# Patient Record
Sex: FEMALE | Race: WHITE | Employment: FULL TIME | URBAN - METROPOLITAN AREA
[De-identification: names, ages, dates, MRNs, and addresses within clinical notes are randomized per-mention and may not be internally consistent; named-entity substitution may affect disease eponyms.]

---

## 2018-05-16 ENCOUNTER — HOSPITAL ENCOUNTER (OUTPATIENT)
Dept: PREADMISSION TESTING | Age: 48
Discharge: HOME | End: 2018-05-16
Payer: COMMERCIAL

## 2018-05-16 ENCOUNTER — HOSPITAL ENCOUNTER (OUTPATIENT)
Dept: OTHER | Age: 48
Discharge: HOME | End: 2018-05-16
Payer: COMMERCIAL

## 2018-05-16 ENCOUNTER — HOSPITAL ENCOUNTER (OUTPATIENT)
Dept: LAB | Age: 48
Discharge: HOME | End: 2018-05-16
Payer: COMMERCIAL

## 2018-05-16 DIAGNOSIS — Z01.811 PRE-OP CHEST EXAM: ICD-10-CM

## 2018-05-16 DIAGNOSIS — Z01.818 PRE-OP TESTING: ICD-10-CM

## 2018-05-16 LAB
ALBUMIN SERPL-MCNC: 3.7 G/DL (ref 3.4–5)
ALBUMIN/GLOB SERPL: 1 {RATIO} (ref 0.8–1.7)
ALP SERPL-CCNC: 59 U/L (ref 45–117)
ALT SERPL-CCNC: 42 U/L (ref 13–56)
ANION GAP SERPL CALC-SCNC: 9 MMOL/L (ref 3–18)
APPEARANCE UR: ABNORMAL
APTT PPP: 26.2 SEC (ref 23–36.4)
AST SERPL-CCNC: 34 U/L (ref 15–37)
ATRIAL RATE: 54 BPM
BACTERIA URNS QL MICRO: ABNORMAL /HPF
BASOPHILS # BLD: 0 K/UL (ref 0–0.06)
BASOPHILS NFR BLD: 1 % (ref 0–2)
BILIRUB SERPL-MCNC: 0.4 MG/DL (ref 0.2–1)
BILIRUB UR QL: NEGATIVE
BUN SERPL-MCNC: 14 MG/DL (ref 7–18)
BUN/CREAT SERPL: 18 (ref 12–20)
CALCIUM SERPL-MCNC: 8.9 MG/DL (ref 8.5–10.1)
CALCULATED P AXIS, ECG09: 32 DEGREES
CALCULATED R AXIS, ECG10: 79 DEGREES
CALCULATED T AXIS, ECG11: 62 DEGREES
CHLORIDE SERPL-SCNC: 103 MMOL/L (ref 100–108)
CO2 SERPL-SCNC: 29 MMOL/L (ref 21–32)
COLOR UR: YELLOW
CREAT SERPL-MCNC: 0.8 MG/DL (ref 0.6–1.3)
DIAGNOSIS, 93000: NORMAL
DIFFERENTIAL METHOD BLD: NORMAL
EOSINOPHIL # BLD: 0.1 K/UL (ref 0–0.4)
EOSINOPHIL NFR BLD: 1 % (ref 0–5)
EPITH CASTS URNS QL MICRO: ABNORMAL /LPF (ref 0–5)
ERYTHROCYTE [DISTWIDTH] IN BLOOD BY AUTOMATED COUNT: 14.4 % (ref 11.6–14.5)
GLOBULIN SER CALC-MCNC: 3.8 G/DL (ref 2–4)
GLUCOSE SERPL-MCNC: 85 MG/DL (ref 74–99)
GLUCOSE UR STRIP.AUTO-MCNC: NEGATIVE MG/DL
HCT VFR BLD AUTO: 44.7 % (ref 35–45)
HGB BLD-MCNC: 15.1 G/DL (ref 12–16)
HGB UR QL STRIP: NEGATIVE
INR PPP: 1 (ref 0.8–1.2)
KETONES UR QL STRIP.AUTO: NEGATIVE MG/DL
LEUKOCYTE ESTERASE UR QL STRIP.AUTO: ABNORMAL
LYMPHOCYTES # BLD: 1.5 K/UL (ref 0.9–3.6)
LYMPHOCYTES NFR BLD: 28 % (ref 21–52)
MCH RBC QN AUTO: 31.2 PG (ref 24–34)
MCHC RBC AUTO-ENTMCNC: 33.8 G/DL (ref 31–37)
MCV RBC AUTO: 92.4 FL (ref 74–97)
MONOCYTES # BLD: 0.4 K/UL (ref 0.05–1.2)
MONOCYTES NFR BLD: 7 % (ref 3–10)
NEUTS SEG # BLD: 3.3 K/UL (ref 1.8–8)
NEUTS SEG NFR BLD: 63 % (ref 40–73)
NITRITE UR QL STRIP.AUTO: NEGATIVE
P-R INTERVAL, ECG05: 122 MS
PH UR STRIP: 8.5 [PH] (ref 5–8)
PLATELET # BLD AUTO: 291 K/UL (ref 135–420)
PMV BLD AUTO: 10.6 FL (ref 9.2–11.8)
POTASSIUM SERPL-SCNC: 4.6 MMOL/L (ref 3.5–5.5)
PROT SERPL-MCNC: 7.5 G/DL (ref 6.4–8.2)
PROT UR STRIP-MCNC: NEGATIVE MG/DL
PROTHROMBIN TIME: 12.5 SEC (ref 11.5–15.2)
Q-T INTERVAL, ECG07: 446 MS
QRS DURATION, ECG06: 90 MS
QTC CALCULATION (BEZET), ECG08: 422 MS
RBC # BLD AUTO: 4.84 M/UL (ref 4.2–5.3)
RBC #/AREA URNS HPF: 0 /HPF (ref 0–5)
SODIUM SERPL-SCNC: 141 MMOL/L (ref 136–145)
SP GR UR REFRACTOMETRY: 1.01 (ref 1–1.03)
UROBILINOGEN UR QL STRIP.AUTO: 0.2 EU/DL (ref 0.2–1)
VENTRICULAR RATE, ECG03: 54 BPM
WBC # BLD AUTO: 5.2 K/UL (ref 4.6–13.2)
WBC URNS QL MICRO: ABNORMAL /HPF (ref 0–4)

## 2018-05-16 PROCEDURE — 87086 URINE CULTURE/COLONY COUNT: CPT | Performed by: NEUROLOGICAL SURGERY

## 2018-05-16 PROCEDURE — 36415 COLL VENOUS BLD VENIPUNCTURE: CPT | Performed by: NEUROLOGICAL SURGERY

## 2018-05-16 PROCEDURE — 85610 PROTHROMBIN TIME: CPT | Performed by: NEUROLOGICAL SURGERY

## 2018-05-16 PROCEDURE — 85025 COMPLETE CBC W/AUTO DIFF WBC: CPT | Performed by: NEUROLOGICAL SURGERY

## 2018-05-16 PROCEDURE — 93005 ELECTROCARDIOGRAM TRACING: CPT

## 2018-05-16 PROCEDURE — 85730 THROMBOPLASTIN TIME PARTIAL: CPT | Performed by: NEUROLOGICAL SURGERY

## 2018-05-16 PROCEDURE — 80053 COMPREHEN METABOLIC PANEL: CPT | Performed by: NEUROLOGICAL SURGERY

## 2018-05-16 PROCEDURE — 81001 URINALYSIS AUTO W/SCOPE: CPT | Performed by: NEUROLOGICAL SURGERY

## 2018-05-16 PROCEDURE — 71046 X-RAY EXAM CHEST 2 VIEWS: CPT

## 2018-05-16 NOTE — PERIOP NOTES
PAT - SURGICAL PRE-ADMISSION INSTRUCTIONS    NAME:  Tate Peralta                                                          TODAY'S DATE:  5/16/2018    SURGERY DATE:  5/31/2018                                  SURGERY ARRIVAL TIME:   0630    1. Do NOT eat or drink anything, including candy or gum, after MIDNIGHT on 05/30/18 , unless you have specific instructions from your Surgeon or Anesthesia Provider to do so. 2. No smoking on the day of surgery. 3. No alcohol 24 hours prior to the day of surgery. 4. No recreational drugs for one week prior to the day of surgery. 5. Leave all valuables, including money/purse, at home. 6. Remove all jewelry, nail polish, makeup (including mascara); no lotions, powders, deodorant, or perfume/cologne/after shave. 7. Glasses/Contact lenses and Dentures may be worn to the hospital.  They will be removed prior to surgery. 8. Call your doctor if symptoms of a cold or illness develop within 24 ours prior to surgery. 9. AN ADULT MUST DRIVE YOU HOME AFTER OUTPATIENT SURGERY. 10. If you are having an OUTPATIENT procedure, please make arrangements for a responsible adult to be with you for 24 hours after your surgery. 11. If you are admitted to the hospital, you will be assigned to a bed after surgery is complete. Normally a family member will not be able to see you until you are in your assigned bed. 15. Family is encouraged to accompany you to the hospital.  We ask visitors in the treatment area to be limited to ONE person at a time to ensure patient privacy. EXCEPTIONS WILL BE MADE AS NEEDED. 15. Children under 12 are discouraged from entering the treatment area and need to be supervised by an adult when in the waiting room. Special Instructions:    NONE. Patient Prep:    use CHG solution    These surgical instructions were reviewed with Mehrdad Martinez during the PAT visit. A printed copy of the instructions was provided to Mehrdad Martinez. Directions:   On the morning of surgery, please go to the [facility]. Enter the building from the [facility] entrance, 1st floor (next to the Emergency Room entrance). Take the elevator to the 2nd floor. Sign in at the Registration Desk.     If you have any questions and/or concerns, please do not hesitate to call:  (Prior to the day of surgery)  [facility] unit:  [phone]  (Day of surgery)  [facility] unit:  [phone]

## 2018-05-18 LAB
BACTERIA SPEC CULT: NORMAL
SERVICE CMNT-IMP: NORMAL

## 2018-05-30 ENCOUNTER — HOSPITAL ENCOUNTER (OUTPATIENT)
Dept: MRI IMAGING | Age: 48
Discharge: HOME | DRG: 027 | End: 2018-05-30
Attending: NEUROLOGICAL SURGERY
Payer: COMMERCIAL

## 2018-05-30 ENCOUNTER — ANESTHESIA EVENT (OUTPATIENT)
Dept: SURGERY | Age: 48
DRG: 027 | End: 2018-05-30
Payer: COMMERCIAL

## 2018-05-30 ENCOUNTER — HOSPITAL ENCOUNTER (OUTPATIENT)
Dept: PREADMISSION TESTING | Age: 48
Discharge: HOME | DRG: 027 | End: 2018-05-30
Attending: NEUROLOGICAL SURGERY
Payer: COMMERCIAL

## 2018-05-30 VITALS — BODY MASS INDEX: 19.94 KG/M2 | WEIGHT: 135 LBS

## 2018-05-30 DIAGNOSIS — D32.9 MENINGIOMA (HCC): ICD-10-CM

## 2018-05-30 DIAGNOSIS — Z01.818 PRE-OP TESTING: ICD-10-CM

## 2018-05-30 LAB
ABO + RH BLD: NORMAL
BLOOD GROUP ANTIBODIES SERPL: NORMAL
SPECIMEN EXP DATE BLD: NORMAL

## 2018-05-30 PROCEDURE — 36415 COLL VENOUS BLD VENIPUNCTURE: CPT | Performed by: NEUROLOGICAL SURGERY

## 2018-05-30 PROCEDURE — A9575 INJ GADOTERATE MEGLUMI 0.1ML: HCPCS | Performed by: NEUROLOGICAL SURGERY

## 2018-05-30 PROCEDURE — 86900 BLOOD TYPING SEROLOGIC ABO: CPT | Performed by: NEUROLOGICAL SURGERY

## 2018-05-30 PROCEDURE — 70553 MRI BRAIN STEM W/O & W/DYE: CPT

## 2018-05-30 PROCEDURE — 74011250636 HC RX REV CODE- 250/636: Performed by: NEUROLOGICAL SURGERY

## 2018-05-30 RX ORDER — GADOTERATE MEGLUMINE 376.9 MG/ML
10 INJECTION INTRAVENOUS
Status: COMPLETED | OUTPATIENT
Start: 2018-05-30 | End: 2018-05-30

## 2018-05-30 RX ADMIN — GADOTERATE MEGLUMINE 10 ML: 376.9 INJECTION INTRAVENOUS at 13:52

## 2018-05-31 ENCOUNTER — HOSPITAL ENCOUNTER (INPATIENT)
Age: 48
LOS: 2 days | Discharge: HOME | DRG: 027 | End: 2018-06-02
Attending: NEUROLOGICAL SURGERY | Admitting: NEUROLOGICAL SURGERY
Payer: COMMERCIAL

## 2018-05-31 ENCOUNTER — ANESTHESIA (OUTPATIENT)
Dept: SURGERY | Age: 48
DRG: 027 | End: 2018-05-31
Payer: COMMERCIAL

## 2018-05-31 DIAGNOSIS — Z98.890 S/P CRANIOTOMY: Primary | ICD-10-CM

## 2018-05-31 PROBLEM — D49.6 BRAIN TUMOR (HCC): Status: ACTIVE | Noted: 2018-05-31

## 2018-05-31 LAB — HCG UR QL: NEGATIVE

## 2018-05-31 PROCEDURE — 76060000038 HC ANESTHESIA 3.5 TO 4 HR: Performed by: NEUROLOGICAL SURGERY

## 2018-05-31 PROCEDURE — 77030008462 HC STPLR SKN PROX J&J -A: Performed by: NEUROLOGICAL SURGERY

## 2018-05-31 PROCEDURE — 76010000174 HC OR TIME 3.5 TO 4 HR INTENSV-TIER 1: Performed by: NEUROLOGICAL SURGERY

## 2018-05-31 PROCEDURE — 77030032490 HC SLV COMPR SCD KNE COVD -B: Performed by: NEUROLOGICAL SURGERY

## 2018-05-31 PROCEDURE — 88333 PATH CONSLTJ SURG CYTO XM 1: CPT | Performed by: NEUROLOGICAL SURGERY

## 2018-05-31 PROCEDURE — 74011250637 HC RX REV CODE- 250/637: Performed by: NEUROLOGICAL SURGERY

## 2018-05-31 PROCEDURE — 77030003160 HC GRFT DURA COLGN MEDT -E: Performed by: NEUROLOGICAL SURGERY

## 2018-05-31 PROCEDURE — 88307 TISSUE EXAM BY PATHOLOGIST: CPT | Performed by: NEUROLOGICAL SURGERY

## 2018-05-31 PROCEDURE — 74011250636 HC RX REV CODE- 250/636

## 2018-05-31 PROCEDURE — 74011000250 HC RX REV CODE- 250

## 2018-05-31 PROCEDURE — 00B10ZZ EXCISION OF CEREBRAL MENINGES, OPEN APPROACH: ICD-10-PCS | Performed by: NEUROLOGICAL SURGERY

## 2018-05-31 PROCEDURE — 77030013079 HC BLNKT BAIR HGGR 3M -A: Performed by: ANESTHESIOLOGY

## 2018-05-31 PROCEDURE — 77030026918 HC ADMN ST IV BLD BD -A: Performed by: ANESTHESIOLOGY

## 2018-05-31 PROCEDURE — 74011000250 HC RX REV CODE- 250: Performed by: NEUROLOGICAL SURGERY

## 2018-05-31 PROCEDURE — 77030005401 HC CATH RAD ARRO -A: Performed by: ANESTHESIOLOGY

## 2018-05-31 PROCEDURE — 81025 URINE PREGNANCY TEST: CPT

## 2018-05-31 PROCEDURE — 74011250636 HC RX REV CODE- 250/636: Performed by: NURSE ANESTHETIST, CERTIFIED REGISTERED

## 2018-05-31 PROCEDURE — C1729 CATH, DRAINAGE: HCPCS | Performed by: NEUROLOGICAL SURGERY

## 2018-05-31 PROCEDURE — 74011250636 HC RX REV CODE- 250/636: Performed by: NEUROLOGICAL SURGERY

## 2018-05-31 PROCEDURE — 65610000009 HC RM ICU NEURO

## 2018-05-31 PROCEDURE — 74011250637 HC RX REV CODE- 250/637: Performed by: NURSE ANESTHETIST, CERTIFIED REGISTERED

## 2018-05-31 PROCEDURE — 77030020480 HC CLP SCLP RANY DISP J&J -A: Performed by: NEUROLOGICAL SURGERY

## 2018-05-31 PROCEDURE — 77030034349 HC TIP SPTZLR BARACUDA DISP STRY -G: Performed by: NEUROLOGICAL SURGERY

## 2018-05-31 PROCEDURE — 77030037730 HC PTTY BN HEMOSRB ABRY -C: Performed by: NEUROLOGICAL SURGERY

## 2018-05-31 PROCEDURE — 77030013797 HC KT TRNSDUC PRSSR EDWD -A: Performed by: ANESTHESIOLOGY

## 2018-05-31 PROCEDURE — 77030013473 HC CRD BPLR AESC -A: Performed by: NEUROLOGICAL SURGERY

## 2018-05-31 PROCEDURE — 77030019908 HC STETH ESOPH SIMS -A: Performed by: ANESTHESIOLOGY

## 2018-05-31 PROCEDURE — 77030002946 HC SUT NRLN J&J -B: Performed by: NEUROLOGICAL SURGERY

## 2018-05-31 PROCEDURE — 77030018673: Performed by: NEUROLOGICAL SURGERY

## 2018-05-31 PROCEDURE — 00U20JZ SUPPLEMENT DURA MATER WITH SYNTHETIC SUBSTITUTE, OPEN APPROACH: ICD-10-PCS | Performed by: NEUROLOGICAL SURGERY

## 2018-05-31 PROCEDURE — C1713 ANCHOR/SCREW BN/BN,TIS/BN: HCPCS | Performed by: NEUROLOGICAL SURGERY

## 2018-05-31 PROCEDURE — 77030008683 HC TU ET CUF COVD -A: Performed by: ANESTHESIOLOGY

## 2018-05-31 PROCEDURE — 77030003028 HC SUT VCRL J&J -A: Performed by: NEUROLOGICAL SURGERY

## 2018-05-31 PROCEDURE — 77030029099 HC BN WAX SSPC -A: Performed by: NEUROLOGICAL SURGERY

## 2018-05-31 PROCEDURE — 77030018390 HC SPNG HEMSTAT2 J&J -B: Performed by: NEUROLOGICAL SURGERY

## 2018-05-31 PROCEDURE — 77030020268 HC MISC GENERAL SUPPLY: Performed by: NEUROLOGICAL SURGERY

## 2018-05-31 PROCEDURE — 77030030722 HC PIN SKULL MAYFLD INLC -B: Performed by: NEUROLOGICAL SURGERY

## 2018-05-31 DEVICE — AGENT HEMSTAT 2GM ABSRB SYNTH BONE PUTTY W/ SPAT: Type: IMPLANTABLE DEVICE | Site: SKULL | Status: FUNCTIONAL

## 2018-05-31 DEVICE — DURA 62100 SUBSTITUTE DUREPAIR 2X2IN NCE
Type: IMPLANTABLE DEVICE | Site: BRAIN | Status: FUNCTIONAL
Brand: DUREPAIR®

## 2018-05-31 DEVICE — 5CC HYDROSET INJECTABLE CEMENT
Type: IMPLANTABLE DEVICE | Site: SKULL | Status: FUNCTIONAL
Brand: HYDROSET

## 2018-05-31 RX ORDER — FENTANYL CITRATE 50 UG/ML
INJECTION, SOLUTION INTRAMUSCULAR; INTRAVENOUS AS NEEDED
Status: DISCONTINUED | OUTPATIENT
Start: 2018-05-31 | End: 2018-05-31 | Stop reason: HOSPADM

## 2018-05-31 RX ORDER — MELOXICAM 15 MG/1
15 TABLET ORAL DAILY
COMMUNITY

## 2018-05-31 RX ORDER — SODIUM CHLORIDE, SODIUM LACTATE, POTASSIUM CHLORIDE, CALCIUM CHLORIDE 600; 310; 30; 20 MG/100ML; MG/100ML; MG/100ML; MG/100ML
50 INJECTION, SOLUTION INTRAVENOUS CONTINUOUS
Status: DISCONTINUED | OUTPATIENT
Start: 2018-06-01 | End: 2018-05-31 | Stop reason: HOSPADM

## 2018-05-31 RX ORDER — SULFAMETHOXAZOLE AND TRIMETHOPRIM 800; 160 MG/1; MG/1
1 TABLET ORAL 2 TIMES DAILY
COMMUNITY

## 2018-05-31 RX ORDER — PROPOFOL 10 MG/ML
VIAL (ML) INTRAVENOUS
Status: DISCONTINUED | OUTPATIENT
Start: 2018-05-31 | End: 2018-05-31 | Stop reason: HOSPADM

## 2018-05-31 RX ORDER — DEXAMETHASONE SODIUM PHOSPHATE 4 MG/ML
2 INJECTION, SOLUTION INTRA-ARTICULAR; INTRALESIONAL; INTRAMUSCULAR; INTRAVENOUS; SOFT TISSUE EVERY 8 HOURS
Status: DISCONTINUED | OUTPATIENT
Start: 2018-06-05 | End: 2018-06-02 | Stop reason: HOSPADM

## 2018-05-31 RX ORDER — SODIUM CHLORIDE AND POTASSIUM CHLORIDE .9; .15 G/100ML; G/100ML
SOLUTION INTRAVENOUS CONTINUOUS
Status: DISCONTINUED | OUTPATIENT
Start: 2018-05-31 | End: 2018-06-02 | Stop reason: HOSPADM

## 2018-05-31 RX ORDER — MIDAZOLAM HYDROCHLORIDE 1 MG/ML
INJECTION, SOLUTION INTRAMUSCULAR; INTRAVENOUS AS NEEDED
Status: DISCONTINUED | OUTPATIENT
Start: 2018-05-31 | End: 2018-05-31 | Stop reason: HOSPADM

## 2018-05-31 RX ORDER — SUCCINYLCHOLINE CHLORIDE 20 MG/ML
INJECTION INTRAMUSCULAR; INTRAVENOUS AS NEEDED
Status: DISCONTINUED | OUTPATIENT
Start: 2018-05-31 | End: 2018-05-31 | Stop reason: HOSPADM

## 2018-05-31 RX ORDER — CEFAZOLIN SODIUM 2 G/50ML
2 SOLUTION INTRAVENOUS
Status: COMPLETED | OUTPATIENT
Start: 2018-05-31 | End: 2018-05-31

## 2018-05-31 RX ORDER — ACETAMINOPHEN 325 MG/1
650 TABLET ORAL
Status: DISCONTINUED | OUTPATIENT
Start: 2018-05-31 | End: 2018-06-02 | Stop reason: HOSPADM

## 2018-05-31 RX ORDER — LIDOCAINE HYDROCHLORIDE 20 MG/ML
INJECTION, SOLUTION EPIDURAL; INFILTRATION; INTRACAUDAL; PERINEURAL AS NEEDED
Status: DISCONTINUED | OUTPATIENT
Start: 2018-05-31 | End: 2018-05-31 | Stop reason: HOSPADM

## 2018-05-31 RX ORDER — SODIUM CHLORIDE 0.9 % (FLUSH) 0.9 %
5-10 SYRINGE (ML) INJECTION EVERY 8 HOURS
Status: DISCONTINUED | OUTPATIENT
Start: 2018-05-31 | End: 2018-05-31 | Stop reason: HOSPADM

## 2018-05-31 RX ORDER — BISACODYL 5 MG
10 TABLET, DELAYED RELEASE (ENTERIC COATED) ORAL DAILY PRN
Status: DISCONTINUED | OUTPATIENT
Start: 2018-05-31 | End: 2018-06-02 | Stop reason: HOSPADM

## 2018-05-31 RX ORDER — DEXAMETHASONE SODIUM PHOSPHATE 4 MG/ML
2 INJECTION, SOLUTION INTRA-ARTICULAR; INTRALESIONAL; INTRAMUSCULAR; INTRAVENOUS; SOFT TISSUE EVERY 6 HOURS
Status: DISCONTINUED | OUTPATIENT
Start: 2018-06-04 | End: 2018-06-02 | Stop reason: HOSPADM

## 2018-05-31 RX ORDER — LABETALOL HYDROCHLORIDE 5 MG/ML
10-20 INJECTION, SOLUTION INTRAVENOUS
Status: DISCONTINUED | OUTPATIENT
Start: 2018-05-31 | End: 2018-06-02 | Stop reason: HOSPADM

## 2018-05-31 RX ORDER — SODIUM CHLORIDE 0.9 % (FLUSH) 0.9 %
5-10 SYRINGE (ML) INJECTION AS NEEDED
Status: DISCONTINUED | OUTPATIENT
Start: 2018-05-31 | End: 2018-05-31 | Stop reason: HOSPADM

## 2018-05-31 RX ORDER — FAMOTIDINE 20 MG/1
20 TABLET, FILM COATED ORAL ONCE
Status: COMPLETED | OUTPATIENT
Start: 2018-06-01 | End: 2018-05-31

## 2018-05-31 RX ORDER — DEXAMETHASONE SODIUM PHOSPHATE 4 MG/ML
INJECTION, SOLUTION INTRA-ARTICULAR; INTRALESIONAL; INTRAMUSCULAR; INTRAVENOUS; SOFT TISSUE AS NEEDED
Status: DISCONTINUED | OUTPATIENT
Start: 2018-05-31 | End: 2018-05-31 | Stop reason: HOSPADM

## 2018-05-31 RX ORDER — ONDANSETRON 2 MG/ML
4 INJECTION INTRAMUSCULAR; INTRAVENOUS
Status: DISCONTINUED | OUTPATIENT
Start: 2018-05-31 | End: 2018-06-02 | Stop reason: HOSPADM

## 2018-05-31 RX ORDER — SODIUM CHLORIDE 9 MG/ML
INJECTION, SOLUTION INTRAVENOUS
Status: DISCONTINUED | OUTPATIENT
Start: 2018-05-31 | End: 2018-05-31 | Stop reason: HOSPADM

## 2018-05-31 RX ORDER — HYDROMORPHONE HYDROCHLORIDE 1 MG/ML
.5-1 INJECTION, SOLUTION INTRAMUSCULAR; INTRAVENOUS; SUBCUTANEOUS
Status: DISCONTINUED | OUTPATIENT
Start: 2018-05-31 | End: 2018-06-02 | Stop reason: HOSPADM

## 2018-05-31 RX ORDER — DEXAMETHASONE SODIUM PHOSPHATE 4 MG/ML
4 INJECTION, SOLUTION INTRA-ARTICULAR; INTRALESIONAL; INTRAMUSCULAR; INTRAVENOUS; SOFT TISSUE EVERY 6 HOURS
Status: DISCONTINUED | OUTPATIENT
Start: 2018-05-31 | End: 2018-06-02 | Stop reason: HOSPADM

## 2018-05-31 RX ORDER — FUROSEMIDE 10 MG/ML
INJECTION INTRAMUSCULAR; INTRAVENOUS AS NEEDED
Status: DISCONTINUED | OUTPATIENT
Start: 2018-05-31 | End: 2018-05-31 | Stop reason: HOSPADM

## 2018-05-31 RX ORDER — ONDANSETRON 2 MG/ML
INJECTION INTRAMUSCULAR; INTRAVENOUS AS NEEDED
Status: DISCONTINUED | OUTPATIENT
Start: 2018-05-31 | End: 2018-05-31 | Stop reason: HOSPADM

## 2018-05-31 RX ORDER — OXYCODONE AND ACETAMINOPHEN 5; 325 MG/1; MG/1
1-2 TABLET ORAL
Status: DISCONTINUED | OUTPATIENT
Start: 2018-05-31 | End: 2018-06-02 | Stop reason: HOSPADM

## 2018-05-31 RX ORDER — PROPOFOL 10 MG/ML
INJECTION, EMULSION INTRAVENOUS AS NEEDED
Status: DISCONTINUED | OUTPATIENT
Start: 2018-05-31 | End: 2018-05-31 | Stop reason: HOSPADM

## 2018-05-31 RX ORDER — FAMOTIDINE 20 MG/1
TABLET, FILM COATED ORAL
Status: DISCONTINUED
Start: 2018-05-31 | End: 2018-05-31

## 2018-05-31 RX ORDER — CEFAZOLIN SODIUM 2 G/50ML
2 SOLUTION INTRAVENOUS EVERY 8 HOURS
Status: COMPLETED | OUTPATIENT
Start: 2018-06-01 | End: 2018-06-01

## 2018-05-31 RX ORDER — MANNITOL 20 G/100ML
INJECTION, SOLUTION INTRAVENOUS
Status: DISCONTINUED | OUTPATIENT
Start: 2018-05-31 | End: 2018-05-31 | Stop reason: HOSPADM

## 2018-05-31 RX ORDER — NICARDIPINE HYDROCHLORIDE 0.1 MG/ML
0-15 INJECTION INTRAVENOUS
Status: DISCONTINUED | OUTPATIENT
Start: 2018-05-31 | End: 2018-05-31

## 2018-05-31 RX ORDER — METOPROLOL TARTRATE 5 MG/5ML
2.5 INJECTION INTRAVENOUS
Status: DISCONTINUED | OUTPATIENT
Start: 2018-05-31 | End: 2018-06-02 | Stop reason: HOSPADM

## 2018-05-31 RX ORDER — INSULIN LISPRO 100 [IU]/ML
INJECTION, SOLUTION INTRAVENOUS; SUBCUTANEOUS ONCE
Status: DISCONTINUED | OUTPATIENT
Start: 2018-06-01 | End: 2018-05-31 | Stop reason: HOSPADM

## 2018-05-31 RX ORDER — HYDROMORPHONE HYDROCHLORIDE 2 MG/ML
INJECTION, SOLUTION INTRAMUSCULAR; INTRAVENOUS; SUBCUTANEOUS AS NEEDED
Status: DISCONTINUED | OUTPATIENT
Start: 2018-05-31 | End: 2018-05-31 | Stop reason: HOSPADM

## 2018-05-31 RX ORDER — EPHEDRINE SULFATE 50 MG/ML
INJECTION, SOLUTION INTRAVENOUS AS NEEDED
Status: DISCONTINUED | OUTPATIENT
Start: 2018-05-31 | End: 2018-05-31 | Stop reason: HOSPADM

## 2018-05-31 RX ORDER — DEXAMETHASONE SODIUM PHOSPHATE 4 MG/ML
4 INJECTION, SOLUTION INTRA-ARTICULAR; INTRALESIONAL; INTRAMUSCULAR; INTRAVENOUS; SOFT TISSUE EVERY 8 HOURS
Status: DISCONTINUED | OUTPATIENT
Start: 2018-06-03 | End: 2018-06-02 | Stop reason: HOSPADM

## 2018-05-31 RX ORDER — DEXAMETHASONE SODIUM PHOSPHATE 4 MG/ML
2 INJECTION, SOLUTION INTRA-ARTICULAR; INTRALESIONAL; INTRAMUSCULAR; INTRAVENOUS; SOFT TISSUE EVERY 12 HOURS
Status: DISCONTINUED | OUTPATIENT
Start: 2018-06-07 | End: 2018-06-02 | Stop reason: HOSPADM

## 2018-05-31 RX ORDER — GLYCOPYRROLATE 0.2 MG/ML
INJECTION INTRAMUSCULAR; INTRAVENOUS AS NEEDED
Status: DISCONTINUED | OUTPATIENT
Start: 2018-05-31 | End: 2018-05-31 | Stop reason: HOSPADM

## 2018-05-31 RX ORDER — VECURONIUM BROMIDE FOR INJECTION 1 MG/ML
INJECTION, POWDER, LYOPHILIZED, FOR SOLUTION INTRAVENOUS AS NEEDED
Status: DISCONTINUED | OUTPATIENT
Start: 2018-05-31 | End: 2018-05-31 | Stop reason: HOSPADM

## 2018-05-31 RX ADMIN — MIDAZOLAM HYDROCHLORIDE 2 MG: 1 INJECTION, SOLUTION INTRAMUSCULAR; INTRAVENOUS at 15:25

## 2018-05-31 RX ADMIN — SODIUM CHLORIDE 20 MG: 9 INJECTION INTRAMUSCULAR; INTRAVENOUS; SUBCUTANEOUS at 20:06

## 2018-05-31 RX ADMIN — FAMOTIDINE 20 MG: 20 TABLET ORAL at 10:33

## 2018-05-31 RX ADMIN — Medication 100 MCG/KG/MIN: at 15:37

## 2018-05-31 RX ADMIN — FENTANYL CITRATE 50 MCG: 50 INJECTION, SOLUTION INTRAMUSCULAR; INTRAVENOUS at 15:36

## 2018-05-31 RX ADMIN — DEXAMETHASONE SODIUM PHOSPHATE 10 MG: 4 INJECTION, SOLUTION INTRA-ARTICULAR; INTRALESIONAL; INTRAMUSCULAR; INTRAVENOUS; SOFT TISSUE at 16:15

## 2018-05-31 RX ADMIN — HYDROMORPHONE HYDROCHLORIDE 2 MG: 2 INJECTION, SOLUTION INTRAMUSCULAR; INTRAVENOUS; SUBCUTANEOUS at 16:20

## 2018-05-31 RX ADMIN — CEFAZOLIN SODIUM 2 G: 2 SOLUTION INTRAVENOUS at 16:10

## 2018-05-31 RX ADMIN — PROPOFOL 150 MG: 10 INJECTION, EMULSION INTRAVENOUS at 15:37

## 2018-05-31 RX ADMIN — VECURONIUM BROMIDE FOR INJECTION 1 MG: 1 INJECTION, POWDER, LYOPHILIZED, FOR SOLUTION INTRAVENOUS at 15:37

## 2018-05-31 RX ADMIN — FENTANYL CITRATE 50 MCG: 50 INJECTION, SOLUTION INTRAMUSCULAR; INTRAVENOUS at 15:30

## 2018-05-31 RX ADMIN — SODIUM CHLORIDE: 9 INJECTION, SOLUTION INTRAVENOUS at 15:30

## 2018-05-31 RX ADMIN — PROPOFOL 50 MG: 10 INJECTION, EMULSION INTRAVENOUS at 15:45

## 2018-05-31 RX ADMIN — EPHEDRINE SULFATE 10 MG: 50 INJECTION, SOLUTION INTRAVENOUS at 15:42

## 2018-05-31 RX ADMIN — ONDANSETRON 4 MG: 2 INJECTION INTRAMUSCULAR; INTRAVENOUS at 15:25

## 2018-05-31 RX ADMIN — OXYCODONE HYDROCHLORIDE AND ACETAMINOPHEN 1 TABLET: 5; 325 TABLET ORAL at 22:27

## 2018-05-31 RX ADMIN — EPHEDRINE SULFATE 5 MG: 50 INJECTION, SOLUTION INTRAVENOUS at 18:01

## 2018-05-31 RX ADMIN — EPHEDRINE SULFATE 10 MG: 50 INJECTION, SOLUTION INTRAVENOUS at 16:45

## 2018-05-31 RX ADMIN — DEXAMETHASONE SODIUM PHOSPHATE 4 MG: 4 INJECTION, SOLUTION INTRAMUSCULAR; INTRAVENOUS at 20:52

## 2018-05-31 RX ADMIN — PROPOFOL 50 MG: 10 INJECTION, EMULSION INTRAVENOUS at 16:15

## 2018-05-31 RX ADMIN — SUCCINYLCHOLINE CHLORIDE 100 MG: 20 INJECTION INTRAMUSCULAR; INTRAVENOUS at 15:37

## 2018-05-31 RX ADMIN — LIDOCAINE HYDROCHLORIDE 50 MG: 20 INJECTION, SOLUTION EPIDURAL; INFILTRATION; INTRACAUDAL; PERINEURAL at 15:37

## 2018-05-31 RX ADMIN — FUROSEMIDE 20 MG: 10 INJECTION INTRAMUSCULAR; INTRAVENOUS at 16:15

## 2018-05-31 RX ADMIN — SODIUM CHLORIDE AND POTASSIUM CHLORIDE 75 ML/HR: 9; 1.49 INJECTION, SOLUTION INTRAVENOUS at 20:03

## 2018-05-31 RX ADMIN — SODIUM CHLORIDE, SODIUM LACTATE, POTASSIUM CHLORIDE, AND CALCIUM CHLORIDE 50 ML/HR: 600; 310; 30; 20 INJECTION, SOLUTION INTRAVENOUS at 10:43

## 2018-05-31 RX ADMIN — GLYCOPYRROLATE 0.2 MG: 0.2 INJECTION INTRAMUSCULAR; INTRAVENOUS at 15:25

## 2018-05-31 RX ADMIN — CEFAZOLIN SODIUM 2 G: 2 SOLUTION INTRAVENOUS at 23:57

## 2018-05-31 RX ADMIN — MANNITOL: 20 INJECTION, SOLUTION INTRAVENOUS at 16:00

## 2018-05-31 NOTE — H&P
History and Physical reviewed; I have examined the patient and there are no pertinent changes.     Rizwana Carvajal MD   2:17 PM 5/31/2018

## 2018-05-31 NOTE — ANESTHESIA PREPROCEDURE EVALUATION
Anesthetic History   No history of anesthetic complications            Review of Systems / Medical History  Patient summary reviewed and pertinent labs reviewed    Pulmonary  Within defined limits                 Neuro/Psych   Within defined limits           Cardiovascular  Within defined limits                Exercise tolerance: >4 METS  Comments: H/o malaria     GI/Hepatic/Renal  Within defined limits              Endo/Other  Within defined limits           Other Findings   Comments: Documentation of current medication  Current medications obtained, documented and obtained? YES      Risk Factors for Postoperative nausea/vomiting:       History of postoperative nausea/vomiting? YES       Female? YES       Motion sickness? NO       Intended opioid administration for postoperative analgesia? YES      Smoking Abstinence:  Current Smoker? NO  Elective Surgery? YES  Seen preoperatively by anesthesiologist or proxy prior to day of surgery? YES  Pt abstained from smoking 24 hours prior to anesthesia?  N/A    Preventive care/screening for High Blood Pressure:  Aged 18 years and older: YES  Screened for high blood pressure: YES  Patients with high blood pressure referred to primary care provider   for BP management: YES             Physical Exam    Airway  Mallampati: II  TM Distance: 4 - 6 cm  Neck ROM: normal range of motion   Mouth opening: Normal     Cardiovascular  Regular rate and rhythm,  S1 and S2 normal,  no murmur, click, rub, or gallop  Rhythm: regular  Rate: normal         Dental    Dentition: Lower dentition intact and Upper dentition intact     Pulmonary  Breath sounds clear to auscultation               Abdominal  GI exam deferred       Other Findings            Anesthetic Plan    ASA: 3  Anesthesia type: general    Monitoring Plan: Arterial line      Induction: Intravenous  Anesthetic plan and risks discussed with: Patient

## 2018-05-31 NOTE — CONSULTS
Consults       Internal Medicine Consult          Consult Requested By: Dr. Lizzie Escobar, specialty    Subjective     HPI: Jina Frazier is a 52 y.o. female with a PMHx of brain tumor who we were consulted for medical management while undergoing image guided left frontal CRANIOTOMY with TUMOR resection. PMHx:  Past Medical History:   Diagnosis Date    [facility]yl[facility] sites [facility] [address]     [facility] ([facility] [address]     Dysentery     Malaria        PSurgHx:  Past Surgical History:   Procedure Laterality Date    HX  SECTION      HX CHOLECYSTECTOMY      HX HEENT      Ear surgery       SocialHx:  Social History     Social History    Marital status:      Spouse name: N/A    Number of children: N/A    Years of education: N/A     Occupational History    Not on file. Social History Main Topics    Smoking status: Never Smoker    Smokeless tobacco: Never Used    Alcohol use 3.0 oz/week     5 Glasses of wine per week    Drug use: No    Sexual activity: Not on file     Other Topics Concern    Not on file     Social History Narrative       FamilyHx:  History reviewed. No pertinent family history. Prior to Admission Medications   Prescriptions Last Dose Informant Patient Reported? Taking?   meloxicam (MOBIC) 15 mg tablet 2018 at Unknown time  Yes Yes   Sig: Take 15 mg by mouth daily. trimethoprim-sulfamethoxazole (BACTRIM DS, SEPTRA DS) 160-800 mg per tablet 2018 at Unknown time  Yes Yes   Sig: Take 1 Tab by mouth two (2) times a day. Facility-Administered Medications: None       Review of Systems:  Constitutional:  Denies fever, chills or weight loss  Eyes: Denies vision loss or changes, denies pain  Ears, Nose, Mouth, Throat: Denies hearing loss, denies sore throat  Cardiovascular:  Denies chest pain or diaphoresis  Respiratory:  Denies coughing, wheezing, or shortness of breath. Gastrointestinal:  Denies nausea or vomitting.   Denies diarrhea or constipation  Genitourinary:  Denies hematuria or dysuria  Musculoskeletal: Denies back pain or muscle/joint pain  Skin:  Denies rashes or moles  Neuro:  Denies seizures, loss of sensation or motor function or syncope      Objective      Visit Vitals    /79 (BP 1 Location: Left arm)    Pulse (!) 59    Temp 98.2 °F (36.8 °C)    Resp 16    Ht 5' 9\" (1.753 m)    Wt 60 kg (132 lb 3 oz)    SpO2 100%    BMI 19.52 kg/m2       Physical Exam:  General Appearance: NAD, conversant  HENT: normocephalic/atraumatic, moist mucus membranes  Lungs: CTA with normal respiratory effort  CV: RRR, no m/r/g  Abdomen: soft, non-tender, normal bowel sounds  Extremities: no cyanosis, no peripheral edema  Neuro: moves all extremities, no focal deficits  Psych: appropriate affect, alert and oriented to person, place and time      Imaging Reviewed:  Mri Brain W Wo Cont    Result Date: 5/31/2018  MR brain History: Headaches, preoperative evaluation for meningioma surgery Comparison: No prior study available. Technique: Limited MR imaging was performed with thin slice axial Q1-INUMPVAE 3-D spoiled gradient echo images were acquired using specified protocol for localization purposes. T1 imaging acquired both pre and post contrast. Additional 3D T2 axial CUBE sequence was acquired along with axial diffusion tensor imaging using specified localizing protocol. Findings: There is a lobulated extra-axial lesion seen at the convexity in the posterior left frontal region which shows avid contrast enhancement. This lesion has a T2 cleft sign indicating extra-axial location. There is minimal effacement of the adjacent frontal lobe cortex of the superior frontal gyrus. No T2 hyperintense edema is identified. This lesion internally is relatively T1 precontrast isointense. On T2-weighted imaging, there is a central area of diminished T2 signal which has slightly more heterogeneous enhancement.  This lesion does abut the left aspect of the superior sagittal sinus which has normal flow void and enhancement. There may be slight effacement of the left inferior aspect of the superior sagittal sinus, not well evaluated without coronal and sagittal imaging. This left frontal convexity dural based lesion measures estimated 2.5 x 2.2 x 2.4 cm in size. No convincing additional areas of abnormal dural enhancement are identified. There is a focal T2 hyperintense lesion with T1 hypointensity seen in the anterior aspect of the right temporal lobe, axial image 72. No superimposed contrast enhancement is seen. This lesion is associated with adjacent parenchymal T2/FLAIR hyperintensity. This lesion measures estimated 1.3 x 0.9 x 1.4 cm in size. Minimal if any local mass effect is present. No similar sized lesions are present elsewhere. There are multiple typical foci of fluid signal along the bilateral basal ganglia and in the deep hemispheric white matter likely representing dilated perivascular spaces. Ventricles are within normal limits in size and configuration. No midline shift or herniation is seen. Major intracranial flow voids are grossly normal. Mild mucoperiosteal thickening is seen in the frontal sinuses. No air-fluid levels are present. Orbits and mastoid air cells are unremarkable. Impression: 1. Limited MR brain performed for stereotactic localization purposes as described above. 2.  Left frontal convexity avidly enhancing extra-axial dural based lesion most likely representing meningioma. Very mild adjacent mass effect without evidence of parenchymal edema. 3. Small cystic-appearing nonenhancing lesion anteriorly in the right temporal lobe with adjacent mild parenchymal T2 hyperintensity, perhaps edema or gliotic changes.  This lesion is nonspecific with a lengthy differential diagnoses including neuroglial cyst, dilated perivascular space, dysembryoplastic neuroepithelial tumor (DNET), infectious cyst (such as neurocysticercosis), cystic metastases, and other cystic lesions. Correlation with complete diagnostic outside MR brain and/or prior studies would be helpful. Assessment/Plan     Active Hospital Problems    Diagnosis Date Noted    Brain tumor [facility]) 05/31/2018     Brain Tumor  -impending image guided left frontal CRANIOTOMY with  TUMOR resection  -defer management to Primary      - Cont acceptable home medications for chronic conditions   - DVT protocol    I reviewed all available labs and imaging that were available prior to my encounter. We appreciate the consultation for medical management and appreciate being able to be involved with their care during hospitalization.       Walter Basilio, [facility] Arizona Behavior[facility] Health ServicesP-BC  [address] [facility]

## 2018-05-31 NOTE — BRIEF OP NOTE
BRIEF OPERATIVE NOTE    Date of Procedure: 5/31/2018   Preoperative Diagnosis: frontal meningioma  d38.0  Postoperative Diagnosis: frontal meningioma  d38.0    Procedure(s):  image guided left frontal CRANIOTOMY with  TUMOR resection  Surgeon(s) and Role:     * Baylee Chappell MD - Primary         Surgical Assistant:     Surgical Staff:  Circ-1: Siena Staton RN  Scrub Tech-1: Aleja Ng  Scrub Tech-2: Ashley Cart  Surg Asst-1: Sanjuana Hunger  Surg Asst-2: Jonn Mew  Event Time In   Incision Start 1622   Incision Close      Anesthesia: General   Estimated Blood Loss: 75  Specimens:   ID Type Source Tests Collected by Time Destination   1 : LEFT FRONTAL TUMOR FS Frozen Section Brain  Baylee Chappell MD 5/31/2018 1725 Pathology   2 : LEFT FRONTAL TUMOR FS Preservative Brain  Baylee Chappell MD 5/31/2018 1726 Pathology      Findings: -   Complications: -  Implants:   Implant Name Type Inv.  Item Serial No.  Lot No. LRB No. Used Action   HYDROSET 5CC (HYDROXYAPATITE) - [id number] Bone HYDROSET 5CC (HYDROXYAPATITE)  [employer] [id number] Left 1 Implanted   PUTTY BNE HEMOSTAT SM 2GR -- HEMOSORB W/SPATULA - [id number]  PUTTY BNE HEMOSTAT SM 2GR -- HEMOSORB W/SPATULA [id number] Left 1 Implanted   GRAFT DURA CLLGN DURPAIR 2X2IN --  - [id number]  GRAFT DURA CLLGN DURPAIR 2X2IN --   [employer] [id number] Left 1 Implanted   GRAFT DURA CLLGN DURPAIR 2X2IN --  - [id number]   GRAFT DURA CLLGN DURPAIR 2X2IN --    [employer] [id number] Left 1 Implanted

## 2018-05-31 NOTE — IP AVS SNAPSHOT
Catrina Ocampo 
 
 
 [address] [place] [address] Patient: Hector Cunningham MRN: [id number] YEN:[id number] A check maria dolores indicates which time of day the medication should be taken. My Medications START taking these medications Instructions Each Dose to Equal  
 Morning Noon Evening Bedtime  
 methylPREDNISolone 4 mg tablet Commonly known as:  MEDROL (SAMANTA) Your last dose was: Your next dose is: Take as indicated  
     
   
   
   
  
 oxyCODONE-acetaminophen 5-325 mg per tablet Commonly known as:  PERCOCET Your last dose was: Your next dose is: Take 1-2 Tabs by mouth every six (6) hours as needed for up to 7 days. Max Daily Amount: 8 Tabs. Indications: Pain 1-2 Tab CONTINUE taking these medications Instructions Each Dose to Equal  
 Morning Noon Evening Bedtime  
 meloxicam 15 mg tablet Commonly known as:  MOBIC Your last dose was: Your next dose is: Take 15 mg by mouth daily. 15 mg  
    
   
   
   
  
 trimethoprim-sulfamethoxazole 160-800 mg per tablet Commonly known as:  BACTRIM DS, SEPTRA DS Your last dose was: Your next dose is: Take 1 Tab by mouth two (2) times a day. 1 Tab Where to Get Your Medications Information on where to get these meds will be given to you by the nurse or doctor. ! Ask your nurse or doctor about these medications  
  methylPREDNISolone 4 mg tablet  
 oxyCODONE-acetaminophen 5-325 mg per tablet

## 2018-05-31 NOTE — IP AVS SNAPSHOT
[address] Patient: Blayne Argueta MRN: [id number] UUX:[id number] About your hospitalization You were admitted on:  May 31, 2018 You last received care in the:  [address] [place] [address],[address] You were discharged on:  June 2, 2018 Why you were hospitalized Your primary diagnosis was:  Brain Tumor (Hcc) Follow-up Information Follow up With Details Comments Contact Info None   None ([phone]) Patient stated that they have no PCP Lexx Osborne MD In 10 days For suture removal [facility] [address] [place] [id number] 
[phone] Discharge Orders None A check maria dolores indicates which time of day the medication should be taken. My Medications START taking these medications Instructions Each Dose to Equal  
 Morning Noon Evening Bedtime  
 methylPREDNISolone 4 mg tablet Commonly known as:  MEDROL (SAMANTA) Your last dose was: Your next dose is: Take as indicated  
     
   
   
   
  
 oxyCODONE-acetaminophen 5-325 mg per tablet Commonly known as:  PERCOCET Your last dose was: Your next dose is: Take 1-2 Tabs by mouth every six (6) hours as needed for up to 7 days. Max Daily Amount: 8 Tabs. Indications: Pain 1-2 Tab CONTINUE taking these medications Instructions Each Dose to Equal  
 Morning Noon Evening Bedtime  
 meloxicam 15 mg tablet Commonly known as:  MOBIC Your last dose was: Your next dose is: Take 15 mg by mouth daily. 15 mg  
    
   
   
   
  
 trimethoprim-sulfamethoxazole 160-800 mg per tablet Commonly known as:  BACTRIM DS, SEPTRA DS Your last dose was: Your next dose is: Take 1 Tab by mouth two (2) times a day. 1 Tab Where to Get Your Medications Information on where to get these meds will be given to you by the nurse or doctor. ! Ask your nurse or doctor about these medications  
  methylPREDNISolone 4 mg tablet  
 oxyCODONE-acetaminophen 5-325 mg per tablet Opioid Education Prescription Opioids: What You Need to Know: 
 
Prescription opioids can be used to help relieve moderate-to-severe pain and are often prescribed following a surgery or injury, or for certain health conditions. These medications can be an important part of treatment but also come with serious risks. Opioids are strong pain medicines. Examples include hydrocodone, oxycodone, fentanyl, and morphine. Heroin is an example of an illegal opioid. It is important to work with your health care provider to make sure you are getting the safest, most effective care. WHAT ARE THE RISKS AND SIDE EFFECTS OF OPIOID USE? Prescription opioids carry serious risks of addiction and overdose, especially with prolonged use. An opioid overdose, often marked by slow breathing, can cause sudden death. The use of prescription opioids can have a number of side effects as well, even when taken as directed. · Tolerance-meaning you might need to take more of a medication for the same pain relief · Physical dependence-meaning you have symptoms of withdrawal when the medication is stopped. Withdrawal symptoms can include nausea, sweating, chills, diarrhea, stomach cramps, and muscle aches. Withdrawal can last up to several weeks, depending on which drug you took and how long you took it. · Increased sensitivity to pain · Constipation · Nausea, vomiting, and dry mouth · Sleepiness and dizziness · Confusion · Depression · Low levels of testosterone that can result in lower sex drive, energy, and strength · Itching and sweating RISKS ARE GREATER WITH:      
· History of drug misuse, substance use disorder, or overdose · Mental health conditions (such as depression or anxiety) · Sleep apnea · Older age (72 years or older) · Pregnancy Avoid alcohol while taking prescription opioids. Also, unless specifically advised by your health care provider, medications to avoid include: · Benzodiazepines (such as Xanax or Valium) · Muscle relaxants (such as Soma or Flexeril) · Hypnotics (such as Ambien or Lunesta) · Other prescription opioids KNOW YOUR OPTIONS Talk to your health care provider about ways to manage your pain that don't involve prescription opioids. Some of these options may actually work better and have fewer risks and side effects. Options may include: 
· Pain relievers such as acetaminophen, ibuprofen, and naproxen · Some medications that are also used for depression or seizures · Physical therapy and exercise · Counseling to help patients learn how to cope better with triggers of pain and stress. · Application of heat or cold compress · Massage therapy · Relaxation techniques Be Informed Make sure you know the name of your medication, how much and how often to take it, and its potential risks & side effects. IF YOU ARE PRESCRIBED OPIOIDS FOR PAIN: 
· Never take opioids in greater amounts or more often than prescribed. Remember the goal is not to be pain-free but to manage your pain at a tolerable level. · Follow up with your primary care provider to: · Work together to create a plan on how to manage your pain. · Talk about ways to help manage your pain that don't involve prescription opioids. · Talk about any and all concerns and side effects. · Help prevent misuse and abuse. · Never sell or share prescription opioids · Help prevent misuse and abuse. · Store prescription opioids in a secure place and out of reach of others (this may include visitors, children, friends, and family).  
· Safely dispose of unused/unwanted prescription opioids: Find your community drug take-back program or your pharmacy mail-back program, or flush them down the toilet, following guidance from the Food and Drug Administration (www.fda.gov/Drugs/ResourcesForYou). · Visit www.cdc.gov/drugoverdose to learn about the risks of opioid abuse and overdose. · If you believe you may be struggling with addiction, tell your health care provider and ask for guidance or call [employer] at 5-552-897-VGLC. Discharge Instructions [facility] [address] Phone:  [phone] Fax:  [phone] Dr. De La Torre Buys Discharge Instructions: With your recent surgery it is not unusual to experience some pain or discomfort in the area of previous pain or the incision. Accordingly, you have been given pain medication for your comfort until the healing process is further along. As time progresses, you should notice the frequency and the intensity of your discomfort steadily decrease. Here are some simple post-operative instructions to help during your home convalescence. 1. Use your pain medication as directed. Refills should be requested during regular office hours and not on weekends by calling [phone] x [phone]. 2. Continue any regular medicine for other conditions (e.g. blood pressure, diabetes, heart, heart problems, etc.) 3. You may ride in a car for short trips. Dr. Quyen Deleon will discuss with you when you can drive. 4. No bending, lifting or strenuous activities. If you need to get to the floor, squat instead of bending. 5. Showers are fine and you may wash your hair. 6. Avoid exercises except for walking. Begin with frequent, but short, periods of walking and gradually build up to longer periods of time. 7. Sit for short periods of time (10-15 minutes) in the first week after surgery. 8. You may resume sexual relations as comfort level allows. 9. Notify us if you develop fever, painful redness around the incision or drainage from the wound. 10. Call and schedule your follow-up appointment with your doctor at [phone] x [phone] If you have any questions, please contact our office at [phone]   Thank You Patient Signature: ________________________  Date: June 1, 2018 [employer] Announcement We are excited to announce that we are making your provider's discharge notes available to you in [employer]. You will see these notes when they are completed and signed by the physician that discharged you from your recent hospital stay. If you have any questions or concerns about any information you see in [employer], please call the Health Information Department where you were seen or reach out to your Primary Care Provider for more information about your plan of care. Introducing [facility] [place] [facility]! [facility] introduces [employer] patient portal. Now you can access parts of your medical record, email your doctor's office, and request medication refills online. 1. In your internet browser, go to [email or web address] 2. Click on the First Time User? Click Here link in the Sign In box. You will see the New Member Sign Up page. 3. Enter your [employer] Access Code exactly as it appears below. You will not need to use this code after youve completed the sign-up process. If you do not sign up before the expiration date, you must request a new code. · [employer] Access Code: Merary Martinez Expires: 8/21/2018 12:08 PM 
 
4. Enter the last four digits of your Social Security Number (xxxx) and Date of Birth (mm/dd/yyyy) as indicated and click Submit. You will be taken to the next sign-up page. 5. Create a Site Intelligencet ID. This will be your Site [employer]ellig[employer] login ID and cannot be changed, so think of one that is secure and easy to remember. 6. Create a [employer]([facility]HI) password. You can change your password at any time. 7. Enter your Password Reset Question and Answer. This can be used at a later time if you forget your password. 8. Enter your e-mail address. You will receive e-mail notification when new information is available in [address] 9. Click Sign Up. You can now view and download portions of your medical record. 10. Click the Download Summary menu link to download a portable copy of your medical information. If you have questions, please visit the Frequently Asked Questions section of the [employer](BHI) website. Remember, [employer](BHI) is NOT to be used for urgent needs. For medical emergencies, dial 911. Now available from your iPhone and Android! Introducing Clayton Weesm As a [employer] patient, I wanted to make you aware of our electronic visit tool called Clayton Weems. [employer] 24/7 allows you to connect within minutes with a medical provider 24 hours a day, seven days a week via a mobile device or tablet or logging into a secure website from your computer. You can access Clayton Weems from anywhere in the [place]. A virtual visit might be right for you when you have a simple condition and feel like you just dont want to get out of bed, or cant get away from work for an appointment, when your regular [employer] provider is not available (evenings, weekends or holidays), or when youre out of town and need minor care. Electronic visits cost only $49 and if the [employer] 24/7 provider determines a prescription is needed to treat your condition, one can be electronically transmitted to a nearby pharmacy*. Please take a moment to enroll today if you have not already done so. The enrollment process is free and takes just a few minutes. To enroll, please download the [employer] 24/7 trista to your tablet or phone, or visit [email or web address] to enroll on your computer. And, as an [address] [place] [address] patient with a [employer] account, the results of your visits will be scanned into your electronic medical record and your primary care provider will be able to view the scanned results. We urge you to continue to see your regular [address] [place] [address] provider for your ongoing medical care. And while your primary care provider may not be the one available when you seek a Clayton Weems virtual visit, the peace of mind you get from getting a real diagnosis real time can be priceless. For more information on Clayton Aguirrefin, view our Frequently Asked Questions (FAQs) at [email or web address]. Sincerely, 
 
Rosana Ash MD 
Chief Medical Officer [facility] Cherri Crockett *:  certain medications cannot be prescribed via Clayton Carlafin Providers Seen During Your Hospitalization Provider Specialty Primary office phone Tuyet Moss MD Neurosurgery [phone] Your Primary Care Physician (PCP) Primary Care Physician Office Phone Office Fax NONE ** None ** ** None ** You are allergic to the following No active allergies Recent Documentation Height Weight BMI OB Status Smoking Status 1.753 m 60.5 kg 19.7 kg/m2 Having regular periods Never Smoker Emergency Contacts Name Discharge Info Relation Home Work Mobile Aguila Peralta DISCHARGE CAREGIVER [3] Spouse [3]   [phone] Patient Belongings The following personal items are in your possession at time of discharge: 
  Dental Appliances: None  Visual Aid: Glasses      Home Medications: None   Jewelry: None  Clothing: Pants, Shirt, Footwear, Undergarments    Other Valuables: Eyeglasses Please provide this summary of care documentation to your next provider. Signatures-by signing, you are acknowledging that this After Visit Summary has been reviewed with you and you have received a copy. Patient Signature:  ____________________________________________________________ Date:  ____________________________________________________________  
  
Kristina Faes Provider Signature:  ____________________________________________________________ Date:  ____________________________________________________________

## 2018-06-01 ENCOUNTER — APPOINTMENT (OUTPATIENT)
Dept: MRI IMAGING | Age: 48
DRG: 027 | End: 2018-06-01
Attending: NEUROLOGICAL SURGERY
Payer: COMMERCIAL

## 2018-06-01 LAB
ANION GAP SERPL CALC-SCNC: 7 MMOL/L (ref 3–18)
APPEARANCE UR: CLEAR
BILIRUB UR QL: NEGATIVE
BUN SERPL-MCNC: 9 MG/DL (ref 7–18)
BUN/CREAT SERPL: 12 (ref 12–20)
CALCIUM SERPL-MCNC: 7.9 MG/DL (ref 8.5–10.1)
CHLORIDE SERPL-SCNC: 107 MMOL/L (ref 100–108)
CO2 SERPL-SCNC: 24 MMOL/L (ref 21–32)
COLOR UR: YELLOW
CREAT SERPL-MCNC: 0.76 MG/DL (ref 0.6–1.3)
ERYTHROCYTE [DISTWIDTH] IN BLOOD BY AUTOMATED COUNT: 13.9 % (ref 11.6–14.5)
GLUCOSE SERPL-MCNC: 151 MG/DL (ref 74–99)
GLUCOSE UR STRIP.AUTO-MCNC: NEGATIVE MG/DL
HCT VFR BLD AUTO: 39.6 % (ref 35–45)
HGB BLD-MCNC: 13.3 G/DL (ref 12–16)
HGB UR QL STRIP: NEGATIVE
KETONES UR QL STRIP.AUTO: NEGATIVE MG/DL
LEUKOCYTE ESTERASE UR QL STRIP.AUTO: NEGATIVE
MCH RBC QN AUTO: 30.4 PG (ref 24–34)
MCHC RBC AUTO-ENTMCNC: 33.6 G/DL (ref 31–37)
MCV RBC AUTO: 90.6 FL (ref 74–97)
NITRITE UR QL STRIP.AUTO: NEGATIVE
PH UR STRIP: 5 [PH] (ref 5–8)
PLATELET # BLD AUTO: 212 K/UL (ref 135–420)
PMV BLD AUTO: 10.2 FL (ref 9.2–11.8)
POTASSIUM SERPL-SCNC: 4.5 MMOL/L (ref 3.5–5.5)
PROT UR STRIP-MCNC: NEGATIVE MG/DL
RBC # BLD AUTO: 4.37 M/UL (ref 4.2–5.3)
SODIUM SERPL-SCNC: 138 MMOL/L (ref 136–145)
SP GR UR REFRACTOMETRY: 1.03 (ref 1–1.03)
UROBILINOGEN UR QL STRIP.AUTO: 0.2 EU/DL (ref 0.2–1)
WBC # BLD AUTO: 8.2 K/UL (ref 4.6–13.2)

## 2018-06-01 PROCEDURE — 74011250637 HC RX REV CODE- 250/637: Performed by: NEUROLOGICAL SURGERY

## 2018-06-01 PROCEDURE — 74011250636 HC RX REV CODE- 250/636: Performed by: NEUROLOGICAL SURGERY

## 2018-06-01 PROCEDURE — 85027 COMPLETE CBC AUTOMATED: CPT | Performed by: NEUROLOGICAL SURGERY

## 2018-06-01 PROCEDURE — 70553 MRI BRAIN STEM W/O & W/DYE: CPT

## 2018-06-01 PROCEDURE — 80048 BASIC METABOLIC PNL TOTAL CA: CPT | Performed by: NEUROLOGICAL SURGERY

## 2018-06-01 PROCEDURE — 74011000250 HC RX REV CODE- 250: Performed by: NEUROLOGICAL SURGERY

## 2018-06-01 PROCEDURE — 65270000029 HC RM PRIVATE

## 2018-06-01 PROCEDURE — 81003 URINALYSIS AUTO W/O SCOPE: CPT | Performed by: NEUROLOGICAL SURGERY

## 2018-06-01 PROCEDURE — A9575 INJ GADOTERATE MEGLUMI 0.1ML: HCPCS | Performed by: NEUROLOGICAL SURGERY

## 2018-06-01 PROCEDURE — 77030027138 HC INCENT SPIROMETER -A

## 2018-06-01 RX ORDER — FAMOTIDINE 20 MG/1
20 TABLET, FILM COATED ORAL 2 TIMES DAILY
Status: DISCONTINUED | OUTPATIENT
Start: 2018-06-01 | End: 2018-06-02 | Stop reason: HOSPADM

## 2018-06-01 RX ORDER — OXYCODONE AND ACETAMINOPHEN 5; 325 MG/1; MG/1
1-2 TABLET ORAL
Qty: 30 TAB | Refills: 0 | Status: SHIPPED | OUTPATIENT
Start: 2018-06-01 | End: 2018-06-08

## 2018-06-01 RX ORDER — METHYLPREDNISOLONE 4 MG/1
TABLET ORAL
Qty: 1 DOSE PACK | Refills: 0 | Status: SHIPPED | OUTPATIENT
Start: 2018-06-01

## 2018-06-01 RX ORDER — GADOTERATE MEGLUMINE 376.9 MG/ML
10 INJECTION INTRAVENOUS
Status: COMPLETED | OUTPATIENT
Start: 2018-06-01 | End: 2018-06-01

## 2018-06-01 RX ADMIN — GADOTERATE MEGLUMINE 10 ML: 376.9 INJECTION INTRAVENOUS at 07:18

## 2018-06-01 RX ADMIN — DEXAMETHASONE SODIUM PHOSPHATE 4 MG: 4 INJECTION, SOLUTION INTRAMUSCULAR; INTRAVENOUS at 20:30

## 2018-06-01 RX ADMIN — SODIUM CHLORIDE AND POTASSIUM CHLORIDE: 9; 1.49 INJECTION, SOLUTION INTRAVENOUS at 10:55

## 2018-06-01 RX ADMIN — CEFAZOLIN SODIUM 2 G: 2 SOLUTION INTRAVENOUS at 16:20

## 2018-06-01 RX ADMIN — DEXAMETHASONE SODIUM PHOSPHATE 4 MG: 4 INJECTION, SOLUTION INTRAMUSCULAR; INTRAVENOUS at 14:02

## 2018-06-01 RX ADMIN — OXYCODONE HYDROCHLORIDE AND ACETAMINOPHEN 1 TABLET: 5; 325 TABLET ORAL at 02:49

## 2018-06-01 RX ADMIN — OXYCODONE HYDROCHLORIDE AND ACETAMINOPHEN 1 TABLET: 5; 325 TABLET ORAL at 07:39

## 2018-06-01 RX ADMIN — OXYCODONE HYDROCHLORIDE AND ACETAMINOPHEN 1 TABLET: 5; 325 TABLET ORAL at 18:19

## 2018-06-01 RX ADMIN — CEFAZOLIN SODIUM 2 G: 2 SOLUTION INTRAVENOUS at 08:22

## 2018-06-01 RX ADMIN — SODIUM CHLORIDE 20 MG: 9 INJECTION INTRAMUSCULAR; INTRAVENOUS; SUBCUTANEOUS at 08:22

## 2018-06-01 RX ADMIN — OXYCODONE HYDROCHLORIDE AND ACETAMINOPHEN 1 TABLET: 5; 325 TABLET ORAL at 14:01

## 2018-06-01 RX ADMIN — SODIUM CHLORIDE AND POTASSIUM CHLORIDE: 9; 1.49 INJECTION, SOLUTION INTRAVENOUS at 20:30

## 2018-06-01 RX ADMIN — DEXAMETHASONE SODIUM PHOSPHATE 4 MG: 4 INJECTION, SOLUTION INTRAMUSCULAR; INTRAVENOUS at 08:22

## 2018-06-01 RX ADMIN — OXYCODONE HYDROCHLORIDE AND ACETAMINOPHEN 1 TABLET: 5; 325 TABLET ORAL at 22:15

## 2018-06-01 RX ADMIN — ONDANSETRON 4 MG: 2 INJECTION INTRAMUSCULAR; INTRAVENOUS at 00:40

## 2018-06-01 RX ADMIN — DEXAMETHASONE SODIUM PHOSPHATE 4 MG: 4 INJECTION, SOLUTION INTRAMUSCULAR; INTRAVENOUS at 01:59

## 2018-06-01 RX ADMIN — FAMOTIDINE 20 MG: 20 TABLET ORAL at 18:19

## 2018-06-01 NOTE — MANAGEMENT PLAN
Discharge/Transition Planning    Interviewed patient with permission of spouse to be in room. Verified demographics listed on face sheet with patient; all information correct. Pt has [place] [facility]. Patient stated their PCP is Dr Carol Pal and last seen for pre-op Patient lives in single family home with spouse. Patient independent with ADLs prior to admission. No use of DME prior to admission. Discharge plan is Home and follow up with neurosurgery and any other designated doctors. Patient has designated ______spouse__________________ to participate in his/her discharge plan and to receive any needed information. Advanced Directive on file. Name: Milady Antunez as pt  Phone       Care Management Interventions  PCP Verified by CM: Yes (Dr Carol Pal)  Last Visit to PCP: 05/25/18  Mode of Transport at Discharge:  Other (see comment) (spouse)  Transition of Care Consult (CM Consult): Discharge Planning  Current Support Network: Lives with Spouse, Own Home       Reason for Admission:   Brain Tumor excision                   RRAT Score:                   3  Plan for utilizing home health:      N/A                    Likelihood of Readmission:  Low                         Transition of Care Plan:       Home        Tanvir Fong RN BSN  Outcomes Manager  Pager # [phone]

## 2018-06-01 NOTE — PROGRESS NOTES
Progress Note      Patient: Elías Mark               Sex: female          DOA: 2018         YOB: 1970      Age:  52 y.o.        LOS:  LOS: 1 day                  Procedure(s):  image guided left frontal CRANIOTOMY with  TUMOR resection  1. Left frontal craniotomy for resection of meningioma. 2.  Cranioplasty skull reconstruction. 3.  Secondary dural repair was sutured graft, onlay graft. 4.  Intraoperative image guidance with Synaptive. 5.  Use of robotic microscope. SURGERY DATE: 2018               Dx:  frontal meningioma  d38.0  Brain tumor [facility])        Past Medical History:   Diagnosis Date    [facility]ylosing spondylitis of multiple sites in spine ([facility] [address]     Brain tumor ([facility] [address]    [facility]       Past Surgical History:   Procedure Laterality Date    HX  SECTION      HX CHOLECYSTECTOMY      HX HEENT      Ear surgery       POD# 1  SUBJECTIVE:  Doing well. Some expected headache but controlled by analgesics and is different than pre-op headache.         OBJECTIVE:  Patient Vitals for the past 24 hrs:   Temp Pulse Resp BP SpO2   18 0739 - 61 14 - 97 %   18 0600 - (!) 53 14 113/74 97 %   18 0500 - (!) 52 14 116/72 97 %   18 0400 98.4 °F (36.9 °C) (!) 52 13 107/75 97 %   18 0300 - (!) 53 12 110/75 97 %   18 0200 - 68 15 105/75 99 %   18 0100 - (!) 58 8 111/79 97 %   18 0000 97.8 °F (36.6 °C) 66 14 121/77 97 %   18 2300 - 65 14 117/77 96 %   18 2200 - 90 12 125/79 97 %   18 2100 - 91 19 128/83 96 %   18 -  9 - 98 %   18 99 °F (37.2 °C) 93 14 132/83 96 %   18 1948 - 98 13 - 97 %   18 193 99 °F (37.2 °C) (!) 104 20 126/78 97 %   18 1022 98.2 °F (36.8 °C) (!) 59 16 106/79 100 %           Vent         Intake and Output    Intake/Output Summary (Last 24 hours) at 18 0939  Last data filed at 18 0739   Gross per 24 hour Intake             6670 ml   Output             3940 ml   Net             2730 ml         Data    Recent Results (from the past 24 hour(s))   METABOLIC PANEL, BASIC    Collection Time: 06/01/18  4:00 AM   Result Value Ref Range    Sodium 138 136 - 145 mmol/L    Potassium 4.5 3.5 - 5.5 mmol/L    Chloride 107 100 - 108 mmol/L    CO2 24 21 - 32 mmol/L    Anion gap 7 3.0 - 18 mmol/L    Glucose 151 (H) 74 - 99 mg/dL    BUN 9 7.0 - 18 MG/DL    Creatinine 0.76 0.6 - 1.3 MG/DL    BUN/Creatinine ratio 12 12 - 20      GFR est AA >60 >60 ml/min/1.73m2    GFR est non-AA >60 >60 ml/min/1.73m2    Calcium 7.9 (L) 8.5 - 10.1 MG/DL   CBC W/O DIFF    Collection Time: 06/01/18  4:00 AM   Result Value Ref Range    WBC 8.2 4.6 - 13.2 K/uL    RBC 4.37 4.20 - 5.30 M/uL    HGB 13.3 12.0 - 16.0 g/dL    HCT 39.6 35.0 - 45.0 %    MCV 90.6 74.0 - 97.0 FL    MCH 30.4 24.0 - 34.0 PG    MCHC 33.6 31.0 - 37.0 g/dL    RDW 13.9 11.6 - 14.5 %    PLATELET 156 186 - 698 K/uL    MPV 10.2 9.2 - 11.8 FL   URINALYSIS W/ RFLX MICROSCOPIC    Collection Time: 06/01/18  4:15 AM   Result Value Ref Range    Color YELLOW      Appearance CLEAR      Specific gravity 1.028 1.005 - 1.030      pH (UA) 5.0 5.0 - 8.0      Protein NEGATIVE  NEG mg/dL    Glucose NEGATIVE  NEG mg/dL    Ketone NEGATIVE  NEG mg/dL    Bilirubin NEGATIVE  NEG      Blood NEGATIVE  NEG      Urobilinogen 0.2 0.2 - 1.0 EU/dL    Nitrites NEGATIVE  NEG      Leukocyte Esterase NEGATIVE  NEG          Current Facility-Administered Medications   Medication Dose Route Frequency    0.9% sodium chloride with KCl 20 mEq/L infusion   IntraVENous CONTINUOUS    ondansetron (ZOFRAN) injection 4 mg  4 mg IntraVENous Q4H PRN    ceFAZolin (ANCEF) 2g IVPB in 50 mL D5W  2 g IntraVENous Q8H    dexamethasone (DECADRON) 4 mg/mL injection 4 mg  4 mg IntraVENous Q6H    [START ON 6/3/2018] dexamethasone (DECADRON) 4 mg/mL injection 4 mg  4 mg IntraVENous Q8H    [START ON 6/4/2018] dexamethasone (DECADRON) 4 mg/mL injection 2 mg  2 mg IntraVENous Q6H    [START ON 6/5/2018] dexamethasone (DECADRON) 4 mg/mL injection 2 mg  2 mg IntraVENous Q8H    [START ON 6/7/2018] dexamethasone (DECADRON) 4 mg/mL injection 2 mg  2 mg IntraVENous Q12H    bisacodyl (DULCOLAX) tablet 10 mg  10 mg Oral DAILY PRN    famotidine (PF) (PEPCID) 20 mg in sodium chloride 0.9% 10 mL injection  20 mg IntraVENous Q12H    labetalol (NORMODYNE;TRANDATE) injection 10-20 mg  10-20 mg IntraVENous Q30MIN PRN    metoprolol (LOPRESSOR) injection 2.5 mg  2.5 mg IntraVENous Q6H PRN    labetalol (NORMODYNE;TRANDATE) 200 mg in 0.9% sodium chloride 200 mL infusion  0.5-2 mg/min IntraVENous TITRATE    acetaminophen (TYLENOL) tablet 650 mg  650 mg Oral Q6H PRN    oxyCODONE-acetaminophen (PERCOCET) 5-325 mg per tablet 1-2 Tab  1-2 Tab Oral Q4H PRN    HYDROmorphone (DILAUDID) syringe 0.5-1 mg  0.5-1 mg IntraVENous Q1H PRN    niCARdipine (CARDENE) 25 mg in 0.9% sodium chloride 250 mL infusion  0-15 mg/hr IntraVENous TITRATE       Physical Exam  General:  Neurologic: Alert and oriented X 3. Speech clear, repetition intact. Some word finding delays. Sensory: normal  Eyes: conjunctivae/corneas clear. PERRL, EOM's intact. Fundi benign  Visual zamora were intact to confrontation  Eye movements were full and conjugate, saccades were accurate, pursuit movements were smooth, and there was no nystagmus. The palate and tongue moved in the midline. Motor: 5/5 throughout. Skin: Crani dressing CDI. Assessment/Plan    52y.o. year old female who is hospital day 1 for Brain tumor ([facility] [address] 1.)  Neurologic:  Doing well. Mobilize. Low Dose Steroid taper. To the surgical floor today. MRI today showed gross total resection of suspected meningioma. Ovoid cystic-appearing nonenhancing lesion in the anterior right temporal lobe with very mild local T2/FLAIR hyperintensity. No associated mass effect. Present on previous MRI. Pathology is pending.     2.) Cardiovascular:  Keep SBP < 140. DC A-line    3.)  Pulmonary:  Pulmonary toilet    4.)  Renal:  DC Branch. DTV in 6 hours. If unable to void, bladder scan. St. Cath for amounts > 500cc.         Assessment and plan made with Dr. Jackqulyn Spurling, NP  6/1/2018  9:51 AM

## 2018-06-01 NOTE — ROUTINE PROCESS
TRANSFER - IN REPORT:    Verbal report received on Alberto Cowart  being received from OR(unit) for routine progression of care      Report consisted of patients Situation, Background, Assessment and   Recommendations(SBAR). Information from the following report(s) SBAR, OR Summary, Intake/Output, MAR and Recent Results was reviewed with the receiving nurse. Opportunity for questions and clarification was provided. Assessment completed upon patients arrival to unit and care assumed. 2310: ace bandage removed at this time per Dr. Virgil Barber order. 0000: assessment completed; neuro exam stable. 0400: assessment completed; neuro exam stable. 0745: Dr. Virgil Barber at beside. Orders received to d/c maloney, arterial line and to get up with assistance. Bedside shift change report given to Donnie Grant RN (oncoming nurse) by Nicci Farooq RN   (offgoing nurse). Report included the following information SBAR, OR Summary, Procedure Summary, Intake/Output, MAR and Recent Results.

## 2018-06-01 NOTE — PROGRESS NOTES
Problem: Falls - Risk of  Goal: *Absence of Falls  Document Derrick Fall Risk and appropriate interventions in the flowsheet. Outcome: Progressing Towards Goal  Fall Risk Interventions:  Mobility Interventions: Assess mobility with egress test, Bed/chair exit alarm    Mentation Interventions: Bed/chair exit alarm, Adequate sleep, hydration, pain control    Medication Interventions: Bed/chair exit alarm    Elimination Interventions: Call light in reach    History of Falls Interventions: Bed/chair exit alarm        Problem: Pressure Injury - Risk of  Goal: *Prevention of pressure injury  Document Ari Scale and appropriate interventions in the flowsheet. Outcome: Progressing Towards Goal  Pressure Injury Interventions:  Sensory Interventions: Assess changes in LOC, Turn and reposition approx.  every two hours (pillows and wedges if needed)         Activity Interventions: Pressure redistribution bed/mattress(bed type)    Mobility Interventions: Pressure redistribution bed/mattress (bed type)    Nutrition Interventions: Document food/fluid/supplement intake

## 2018-06-01 NOTE — DISCHARGE SUMMARY
Discharge Summary    Patient: Halima Moreno               Sex: female          DOA: 5/31/2018         YOB: 1970      Age:  52 y.o.        LOS:  LOS: 1 day           Admit Date: 5/31/2018    Discharge Date: 6/1/2018    Consults: Hospitalist    Admission Diagnoses: frontal meningioma  d38.0  Brain tumor [facility])    Discharge Diagnoses:    Problem List as of 6/1/2018  Never Reviewed          Codes Class Noted - Resolved    * (Principal)Brain tumor ([facility] [address] ICD-10-CM: D49.6  ICD-9-CM: 239.6  5/31/2018 - Present              No Known Allergies       Operative Procedure Performed: Procedure(s):  image guided left frontal CRANIOTOMY with  TUMOR resection   1.  Left frontal craniotomy for resection of meningioma. 2.  Cranioplasty skull reconstruction. 3.  Secondary dural repair was sutured graft, onlay graft. 4.  Intraoperative image guidance with [employer] [place]. 5.  Use of robotic microscope. Data:   Patient Vitals for the past 12 hrs:   Temp Pulse Resp BP SpO2   06/01/18 0739 - 61 14 - 97 %   06/01/18 0600 - (!) 53 14 113/74 97 %   06/01/18 0500 - (!) 52 14 116/72 97 %   06/01/18 0400 98.4 °F (36.9 °C) (!) 52 13 107/75 97 %   06/01/18 0300 - (!) 53 12 110/75 97 %   06/01/18 0200 - 68 15 105/75 99 %   06/01/18 0100 - (!) 58 8 111/79 97 %   06/01/18 0000 97.8 °F (36.6 °C) 66 14 121/77 97 %   05/31/18 2300 - 65 14 117/77 96 %       HPI:  Ms. Halima Moreno  is a 52 y.o.  female with PMH of anxiety, ankylosing spondylitis of spine, and HTN who is followed by Dr. Chetna Henderson as an outpatient for tremor and headache. Imaging showed she had a medial left frontal tumor that is likely a meningioma. .    The patient elected to undergo the above named procedure as intervention. Patient did obtain medical clearance from their primary care provider prior to undergoing elective surgery.     Hospital Course: Ms. Halima Moreno was admitted to the hospital on 5/31/2018  9:20 AM .  The patient underwent the above named procedure. Remained hemodynamically stable during their hospitalization. Received appropriate pre and post operative prophylactic antibiotics as well as maintained on SCDs. Physical Exam:  Neurologic: Alert and oriented X 3. Speech clear, repetition intact. Some word finding delays. Sensory: normal  Eyes: conjunctivae/corneas clear. PERRL, EOM's intact. Fundi benign  Visual zamora were intact to confrontation  Eye movements were full and conjugate, saccades were accurate, pursuit movements were smooth, and there was no nystagmus. The palate and tongue moved in the midline. Motor: 5/5 throughout. Skin: Incision to left skull with staples intact, edges well approximated, no erythema, no edema, no drainage, no ecchymosis, and no hematoma. Condition at discharge is good. Pain was controlled with prn analgesics. Discharge Medications:     Current Discharge Medication List      START taking these medications    Details   oxyCODONE-acetaminophen (PERCOCET) 5-325 mg per tablet Take 1-2 Tabs by mouth every six (6) hours as needed for up to 7 days. Max Daily Amount: 8 Tabs. Indications: Pain  Qty: 30 Tab, Refills: 0    Associated Diagnoses: S/P craniotomy      methylPREDNISolone (MEDROL, SAMANTA,) 4 mg tablet Take as indicated  Qty: 1 Dose Pack, Refills: 0         CONTINUE these medications which have NOT CHANGED    Details   trimethoprim-sulfamethoxazole (BACTRIM DS, SEPTRA DS) 160-800 mg per tablet Take 1 Tab by mouth two (2) times a day. meloxicam (MOBIC) 15 mg tablet Take 15 mg by mouth daily. Discharge Instructions: With your recent surgery it is not unusual to experience some pain or discomfort in the area of previous pain or the incision. Accordingly, you have been given pain medication for your comfort until the healing process is further along.   As time progresses, you should notice the frequency and the intensity of your discomfort steadily decrease. Here are some simple post-operative instructions to help during your home convalescence. 1. Use your pain medication as directed. Refills should be requested during regular office hours and not on weekends by calling [phone]. 2. Continue any regular medicine for other conditions (e.g. blood pressure, diabetes, heart, heart problems, etc.)    3. You may ride in a car for short trips. Dr. Gino Perdue will discuss with you when you can drive. 4. No bending, lifting or strenuous activities. If you need to get to the floor, squat instead of bending. 5. Showers are fine and you may wash your hair. 6. Avoid exercises except for walking. Begin with frequent, but short, periods of walking and gradually build up to longer periods of time. 7. Sit for short periods of time (10-15 minutes) in the first week after surgery. 8. You may resume sexual relations as comfort level allows. 9. Notify us if you develop fever, painful redness around the incision or drainage from the wound. 10. Call and schedule your follow-up appointment with your doctor at [phone] x [phone]    Discharge Disposition:  Patient is medically stable for discharge to home from hospital with above discharge medications, instructions, and follow up care. Discharge assessement and plan made with Dr. Gino Perdue. CC:  PCP: None     Patient seen and examined. Agree with above.  MRI with gross total resection

## 2018-06-01 NOTE — ANESTHESIA POSTPROCEDURE EVALUATION
Post-Anesthesia Evaluation and Assessment    Patient: Halima Moreno MRN: [id number]  SSN: [id number]    YOB: 1970  Age: 52 y.o. Sex: female       Cardiovascular Function/Vital Signs  Visit Vitals    /83    Pulse 93    Temp 37.2 °C (99 °F)    Resp 9    Ht 5' 9\" (1.753 m)    Wt 60 kg (132 lb 3 oz)    SpO2 98%    BMI 19.52 kg/m2       Patient is status post general anesthesia for Procedure(s):  image guided left frontal CRANIOTOMY with  TUMOR resection. Nausea/Vomiting: None    Postoperative hydration reviewed and adequate. Pain:  Pain Scale 1: Numeric (0 - 10) (05/31/18 1022)  Pain Intensity 1: 0 (05/31/18 1022)   Managed    Neurological Status:   Neuro (WDL): Within Defined Limits (05/31/18 1017)  Neuro  Neurologic State: Alert (05/31/18 2000)  Orientation Level: Oriented X4 (05/31/18 2000)  Cognition: Follows commands (05/31/18 2000)  Speech: Clear (05/31/18 2000)  Assessment L Pupil: Brisk;Round (05/31/18 2000)  Size L Pupil (mm): 2 (05/31/18 2000)  Assessment R Pupil: Brisk;Round (05/31/18 2000)  Size R Pupil (mm): 2 (05/31/18 2000)  LUE Motor Response: Purposeful;Spontaneous  (05/31/18 2000)  LLE Motor Response: Purposeful;Spontaneous  (05/31/18 2000)  RUE Motor Response: Purposeful;Spontaneous  (05/31/18 2000)  RLE Motor Response: Purposeful;Spontaneous  (05/31/18 2000)   At baseline    Mental Status and Level of Consciousness: Arousable    Pulmonary Status:   O2 Device: Room air (05/31/18 1932)   Adequate oxygenation and airway patent    Complications related to anesthesia: None    Post-anesthesia assessment completed.  No concerns    Signed By: Armand Henson MD     May 31, 2018

## 2018-06-01 NOTE — PROGRESS NOTES
Progress Note      Patient: Ella Galo               Sex: female          DOA: 5/31/2018       YOB: 1970      Age:  52 y.o.        LOS:  LOS: 1 day             CHIEF COMPLAINT:  Frontal hemangioma    Subjective:     Patient is awake and alert  Some headache  No weakness    Objective:      Visit Vitals    /74    Pulse 61    Temp 98.4 °F (36.9 °C)    Resp 14    Ht 5' 9\" (1.753 m)    Wt 60.5 kg (133 lb 6.1 oz)    SpO2 97%    BMI 19.7 kg/m2       Physical Exam:  Gen:  Patient is in no distress. No complaint  HEENT and Neck:  PERRL, No cervical tenderness or masses  Lungs:  Clear bilaterally. No rales, no wheeze. Normal effort. Heart:  Regular Rate and Rhythm. No murmur or gallop. No JVD. No edema. Abdomen:  Soft, non tender, no masses, no Hepatosplenomegally  Extremities:  No digital clubbing, no cyanosis  Neuro:  Alert and oriented, Normal affect, Cranial nerves intact, No sensory deficits        Lab/Data Reviewed:  BMP:   Lab Results   Component Value Date/Time     06/01/2018 04:00 AM    K 4.5 06/01/2018 04:00 AM     06/01/2018 04:00 AM    CO2 24 06/01/2018 04:00 AM    AGAP 7 06/01/2018 04:00 AM     (H) 06/01/2018 04:00 AM    BUN 9 06/01/2018 04:00 AM    CREA 0.76 06/01/2018 04:00 AM    GFRAA >60 06/01/2018 04:00 AM    GFRNA >60 06/01/2018 04:00 AM       Assessment/Plan     Frontal hemangioma  S/p hemangioma resection  Headache, reasonable    Plan:  Continue with pain control  Mobilize when okay with Neurosurger  Follow neuro status  BP control  DVT prophylaxis.

## 2018-06-01 NOTE — OP NOTES
[address] [facility]  OPERATIVE REPORT    Name:LAWANDA Shahid  MR#: [id number]  : 1970  ACCOUNT #: [de-identified]   DATE OF SERVICE: 2018    PREOPERATIVE DIAGNOSIS:  Left frontal meningioma. POSTOPERATIVE DIAGNOSIS:  Left frontal meningioma. PROCEDURES PERFORMED:   1. Left frontal craniotomy for resection of meningioma. 2.  Cranioplasty skull reconstruction. 3.  Secondary dural repair was sutured graft, onlay graft. 4.  Intraoperative image guidance with [employer]. 5.  Use of robotic microscope. SURGEON:  Dr. Tammy Platt MD    ASSISTANT:  -    ANESTHESIA:  General orotracheal.    ESTIMATED BLOOD LOSS:  75 mL. COMPLICATIONS:  None    SPECIMENS REMOVED:  Tumor. IMPLANTS:  See note. BRIEF CLINICAL NOTE:  This 41-year-old woman presents with a left frontal meningioma, some progressive headache and other symptoms as noted. She has been weighing the options of continued surveillance versus resection and she and her  requested to proceed with the above procedure. PROCEDURE IN DETAIL:  After informed consent was given, the patient was brought to the operating room and underwent induction of general orotracheal anesthesia without difficulty. Following this, she was carefully positioned supine upon the operating table, all pressure points carefully padded. The head was maintained in [place] skull clamp. Facial and scalp anatomy was registered with the [facility] workstation to create an operative plan. Neuro monitoring electrodes were placed for SSEP, EEG and motor stimulation. A coronal type incision was outlined, a linear hair shave technique was carried out. An incision was outlined. We prepped and draped in the usual sterile fashion. Local anesthetic was infiltrated. The incision was opened. A craniotomy based on the midline was made with a Midas Enoch drill and circumferential dural tack-up sutures were placed. The dura was opened based medially.   There appeared to be an obvious appearing meningioma. Part of it was soft, part of it was more firm and partially calcified. The [employer] Modus V microscope was then brought into the case on the robotic arm. This was used to navigate and follow our movements with resection of the tumor. We used the Sonopet ultrasonic aspirator as well. Most of the tumor had a fairly good plane down deep. The arachnoid was a little more adherent to the tamica. We dissected completely around the tumor, removed it completely. The attachment medially against the dura and posteriorly was extensively cauterized. The dura appeared to be involved and I excised the entire dural flap over it, which would require later duraplasty and secondary repair. A gross total resection appeared to have been accomplished. The cavity was lined with Surgicel. Transient use of Gelfoam soaked in thrombin was used. The Gelfoam was irrigated out. The dura was closed secondarily with a Drik dura repair graft sewn circumferentially several 4-0 Nurolon sutures. The bone flap was secured over central dural tack-up sutures with the miniplates and screws. I should mention that we carried out EEG and SSEP which were stable. We attempted to elicit motor responses. We appeared to be in the prefrontal area. We had some after discharge thresholds at 5 milliamps, but no stimulation of motor and this confirmed we were in front of the motor area. Cranioplasty skull reconstruction was carried out with Maggi HydroSet bone cement and a good reconstructive result was obtained. The wound was irrigated, closed in layers of vancomycin powder, 2-0 Vicryl, staples in the skin. A sterile dressing standard head wrap were applied. All sponge and needle counts correct at the end of the case and the patient was extubated in the operating room, taken to the neurosurgical intensive care unit in stable condition.       MD DARNELL Michaud / [place]  D: 05/31/2018 20:14     T: 05/31/2018 20:41  JOB #: [id number]

## 2018-06-01 NOTE — PROGRESS NOTES
1641: TRANSFER - OUT REPORT:    Verbal report given to Scar Jay RN ([place]) (name) on [facility]  being transferred to 2 Central (unit) for routine progression of care       Report consisted of patients Situation, Background, Assessment and   Recommendations(SBAR). Information from the following report(s) SBAR, Procedure Summary, Intake/Output, MAR, Recent Results, Cardiac Rhythm NSR and Alarm Parameters  was reviewed with the receiving nurse. Lines:   Peripheral IV 05/31/18 Left Arm (Active)   Site Assessment Clean, dry, & intact 6/1/2018  3:00 PM   Phlebitis Assessment 0 6/1/2018  3:00 PM   Infiltration Assessment 0 6/1/2018  3:00 PM   Dressing Status Clean, dry, & intact 6/1/2018 12:00 PM   Dressing Type Transparent 6/1/2018 12:00 PM   Hub Color/Line Status Patent 6/1/2018 12:00 PM   Action Taken Open ports on tubing capped 6/1/2018 12:00 PM   Alcohol Cap Used Yes 6/1/2018 12:00 PM       Peripheral IV 05/31/18 Right Wrist (Active)   Site Assessment Clean, dry, & intact 6/1/2018  3:00 PM   Phlebitis Assessment 0 6/1/2018  3:00 PM   Infiltration Assessment 0 6/1/2018  3:00 PM   Dressing Status Clean, dry, & intact 6/1/2018 12:00 PM   Dressing Type Transparent 6/1/2018 12:00 PM   Hub Color/Line Status Patent 6/1/2018 12:00 PM   Action Taken Open ports on tubing capped 6/1/2018 12:00 PM   Alcohol Cap Used Yes 6/1/2018 12:00 PM        Opportunity for questions and clarification was provided.       Patient transported with:   Registered Nurse

## 2018-06-02 VITALS
BODY MASS INDEX: 19.76 KG/M2 | HEIGHT: 69 IN | DIASTOLIC BLOOD PRESSURE: 75 MMHG | TEMPERATURE: 98 F | HEART RATE: 67 BPM | WEIGHT: 133.38 LBS | RESPIRATION RATE: 16 BRPM | SYSTOLIC BLOOD PRESSURE: 114 MMHG | OXYGEN SATURATION: 95 %

## 2018-06-02 PROBLEM — M45.0 ANKYLOSING SPONDYLITIS OF MULTIPLE SITES IN SPINE (HCC): Status: ACTIVE | Noted: 2018-06-02

## 2018-06-02 PROBLEM — K59.00 CONSTIPATION: Status: ACTIVE | Noted: 2018-06-02

## 2018-06-02 PROCEDURE — 74011250637 HC RX REV CODE- 250/637: Performed by: NEUROLOGICAL SURGERY

## 2018-06-02 PROCEDURE — 74011250636 HC RX REV CODE- 250/636: Performed by: NEUROLOGICAL SURGERY

## 2018-06-02 RX ADMIN — DEXAMETHASONE SODIUM PHOSPHATE 4 MG: 4 INJECTION, SOLUTION INTRAMUSCULAR; INTRAVENOUS at 08:47

## 2018-06-02 RX ADMIN — OXYCODONE HYDROCHLORIDE AND ACETAMINOPHEN 1 TABLET: 5; 325 TABLET ORAL at 08:48

## 2018-06-02 RX ADMIN — DEXAMETHASONE SODIUM PHOSPHATE 4 MG: 4 INJECTION, SOLUTION INTRAMUSCULAR; INTRAVENOUS at 01:45

## 2018-06-02 RX ADMIN — FAMOTIDINE 20 MG: 20 TABLET ORAL at 08:47

## 2018-06-02 NOTE — PROGRESS NOTES
Progress Note      Patient: Jayk Aldana               Sex: female          DOA: 5/31/2018         YOB: 1970      Age:  52 y.o.         LOS: 2 days               Subjective:     Patient seen and evaluated this am.  Stable. No reported changes. No issues. Objective:      Visit Vitals    /75    Pulse (!) 59    Temp 98.6 °F (37 °C)    Resp 16    Ht 5' 9\" (1.753 m)    Wt 60.5 kg (133 lb 6.1 oz)    SpO2 96%    BMI 19.7 kg/m2         Physical Exam:  Incision c/d/i  Head wrap removed. Afebrile. Assessment/Plan     Principal Problem:    Brain tumor ([address] (5/31/2018)      D/C home today. Instructions and Medications given by Dr Tammy Platt yesterday. Follow up with Dr Tammy Platt in 2 weeks [phone].

## 2018-06-02 NOTE — DISCHARGE INSTRUCTIONS
[facility], [facility]. [place]  Phone:  [phone]  Fax:  [phone]    Dr. Rohan Buchanan    Discharge Instructions: With your recent surgery it is not unusual to experience some pain or discomfort in the area of previous pain or the incision. Accordingly, you have been given pain medication for your comfort until the healing process is further along. As time progresses, you should notice the frequency and the intensity of your discomfort steadily decrease. Here are some simple post-operative instructions to help during your home convalescence. 1. Use your pain medication as directed. Refills should be requested during regular office hours and not on weekends by calling [phone]. 2. Continue any regular medicine for other conditions (e.g. blood pressure, diabetes, heart, heart problems, etc.)    3. You may ride in a car for short trips. Dr. Jose Sesay will discuss with you when you can drive. 4. No bending, lifting or strenuous activities. If you need to get to the floor, squat instead of bending. 5. Showers are fine and you may wash your hair. 6. Avoid exercises except for walking. Begin with frequent, but short, periods of walking and gradually build up to longer periods of time. 7. Sit for short periods of time (10-15 minutes) in the first week after surgery. 8. You may resume sexual relations as comfort level allows. 9. Notify us if you develop fever, painful redness around the incision or drainage from the wound.     10. Call and schedule your follow-up appointment with your doctor at [phone]            If you have any questions, please contact our office at [phone]   Thank You      Patient Signature: ________________________  Date: June 1, 2018

## 2018-06-02 NOTE — PROGRESS NOTES
0740 Received pt resting in bed. Dressing on the head c/d/i. Pain under control. No numbness no tingling. No problem swallowing, no numbness or weakness in the face, speech is clear. 0840 MD called, requesting for stockinet for pt. Bulky dressing removed, 4x4 left on the incision site c/d/i. Pt discharged    0940 Reviewed discharge instructions with pt, pt verbalized understanding. Stockinet placed, incision with staples c/d/i. IV site d/c, no sign of infiltration. Pain under control. Speech is clear,no problem swallowing, no numbness or weakness in the face. Can wiggle fingers and toes, no weakness in all extremities. 1015 Pt wheeled down.

## 2018-06-02 NOTE — PROGRESS NOTES
Problem: Falls - Risk of  Goal: *Absence of Falls  Document Derrick Fall Risk and appropriate interventions in the flowsheet. Outcome: Progressing Towards Goal  Fall Risk Interventions:  Mobility Interventions: Communicate number of staff needed for ambulation/transfer, Patient to call before getting OOB, Strengthening exercises (ROM-active/passive)    Mentation Interventions: Door open when patient unattended    Medication Interventions: Evaluate medications/consider consulting pharmacy, Patient to call before getting OOB, Teach patient to arise slowly    Elimination Interventions: Call light in reach, Patient to call for help with toileting needs, Toileting schedule/hourly rounds    History of Falls Interventions: Door open when patient unattended        Problem: Pressure Injury - Risk of  Goal: *Prevention of pressure injury  Document Ari Scale and appropriate interventions in the flowsheet.    Outcome: Progressing Towards Goal  Pressure Injury Interventions:  Sensory Interventions: Maintain/enhance activity level, Minimize linen layers, Keep linens dry and wrinkle-free         Activity Interventions: Increase time out of bed, Pressure redistribution bed/mattress(bed type)    Mobility Interventions: HOB 30 degrees or less, Pressure redistribution bed/mattress (bed type)    Nutrition Interventions: Document food/fluid/supplement intake, Offer support with meals,snacks and hydration    Friction and Shear Interventions: Apply protective barrier, creams and emollients, Lift sheet, Foam dressings/transparent film/skin sealants, Minimize layers

## 2018-06-02 NOTE — PROGRESS NOTES
attempted to conduct an initial consultation and spiritual assessment for Gil Cr, who is a 52 y. o.female. However, patient was unavailable, no longer in her ICU bed. Patients primary language is: [place]. According to the patients EMR, her [employer] affiliation is: [address] [place] [address]. The  provided the following interventions:  Offered silent prayer on patient's behalf. Reviewed chart. Plan:  Chaplains will continue our attempts to connect with patient and then provide pastoral care on an as-needed/requested basis.     [facility]  Board Certified [address] [place] [address]    [phone]

## 2018-06-02 NOTE — PROGRESS NOTES
Internal Medicine Progress Note    Patient's Name: Lance Amador Date: 5/31/2018  Length of Stay: 2      Assessment/Plan     Active Hospital Problems    Diagnosis Date Noted    Ankylosing spondylitis of multiple sites in spine [facility]) 06/02/2018    Constipation 06/02/2018    Brain tumor ([facility] [address] 05/31/2018     - Diet and mobilization per primary team  - PT/OT  - Pain control PRN  - Recommended senokot-s OTC for regularity  - OK for d/c from med standpoint  - Cont acceptable home medications for chronic conditions   - DVT protocol    I have personally reviewed all pertinent labs and films that have officially resulted over the last 24 hours. I have personally checked for all pending labs that are awaiting final results. Subjective     Pt s/e @ bedside  No major events overnight  Constipated  Denies CP or SOB    Objective     Visit Vitals    /75 (BP 1 Location: Left arm, BP Patient Position: At rest)    Pulse 67    Temp 98 °F (36.7 °C)    Resp 16    Ht 5' 9\" (1.753 m)    Wt 60.5 kg (133 lb 6.1 oz)    SpO2 95%    BMI 19.7 kg/m2       Physical Exam:  General Appearance: NAD, conversant but difficulty with getting things out at times  Lungs: CTA with normal respiratory effort  CV: RRR, no m/r/g  Abdomen: soft, non-tender, normal bowel sounds  Extremities: no cyanosis, no peripheral edema  Neuro: No focal deficits, motor/sensory intact    Lab/Data Reviewed:  BMP: No results found for: NA, K, CL, CO2, AGAP, GLU, BUN, CREA, GFRAA, GFRNA  CBC: No results found for: WBC, HGB, HGBEXT, HCT, HCTEXT, PLT, PLTEXT, HGBEXT, HCTEXT, PLTEXT    Imaging Reviewed:  No results found.     Medications Reviewed:  Current Facility-Administered Medications   Medication Dose Route Frequency    famotidine (PEPCID) tablet 20 mg  20 mg Oral BID    0.9% sodium chloride with KCl 20 mEq/L infusion   IntraVENous CONTINUOUS    ondansetron (ZOFRAN) injection 4 mg  4 mg IntraVENous Q4H PRN    dexamethasone (DECADRON) 4 mg/mL injection 4 mg  4 mg IntraVENous Q6H    [START ON 6/3/2018] dexamethasone (DECADRON) 4 mg/mL injection 4 mg  4 mg IntraVENous Q8H    [START ON 6/4/2018] dexamethasone (DECADRON) 4 mg/mL injection 2 mg  2 mg IntraVENous Q6H    [START ON 6/5/2018] dexamethasone (DECADRON) 4 mg/mL injection 2 mg  2 mg IntraVENous Q8H    [START ON 6/7/2018] dexamethasone (DECADRON) 4 mg/mL injection 2 mg  2 mg IntraVENous Q12H    bisacodyl (DULCOLAX) tablet 10 mg  10 mg Oral DAILY PRN    labetalol (NORMODYNE;TRANDATE) injection 10-20 mg  10-20 mg IntraVENous Q30MIN PRN    metoprolol (LOPRESSOR) injection 2.5 mg  2.5 mg IntraVENous Q6H PRN    labetalol (NORMODYNE;TRANDATE) 200 mg in 0.9% sodium chloride 200 mL infusion  0.5-2 mg/min IntraVENous TITRATE    acetaminophen (TYLENOL) tablet 650 mg  650 mg Oral Q6H PRN    oxyCODONE-acetaminophen (PERCOCET) 5-325 mg per tablet 1-2 Tab  1-2 Tab Oral Q4H PRN    HYDROmorphone (DILAUDID) syringe 0.5-1 mg  0.5-1 mg IntraVENous Q1H PRN    niCARdipine (CARDENE) 25 mg in 0.9% sodium chloride 250 mL infusion  0-15 mg/hr IntraVENous TITRATE     Current Outpatient Prescriptions   Medication Sig    oxyCODONE-acetaminophen (PERCOCET) 5-325 mg per tablet Take 1-2 Tabs by mouth every six (6) hours as needed for up to 7 days. Max Daily Amount: 8 Tabs. Indications: Pain    methylPREDNISolone (MEDROL, SAMANTA,) 4 mg tablet Take as indicated    trimethoprim-sulfamethoxazole (BACTRIM DS, SEPTRA DS) 160-800 mg per tablet Take 1 Tab by mouth two (2) times a day.  meloxicam (MOBIC) 15 mg tablet Take 15 mg by mouth daily.            Dimple Kayser, DO  Internal Medicine, Hospitalist  Pager: [phone] [facility]

## 2018-06-02 NOTE — PROGRESS NOTES
Bedside shift change report given to Desiderio Spatz, RN (oncoming nurse) by Luis Felipe Cullen RN (offgoing nurse). Report included the following information SBAR, Kardex, OR Summary and MAR. Patient resting in bed, NAD noted,  at the bedside. 2100: SCDs applied, ICS provided, preformed with patient, patient verbalizes purpose of SCDs and ICS. Patient eating and voiding well, denies pain, nausea, neuro checks have been WDL since shift change. Blood pressure WDL.     0325: Patient asleep, NAD noted. Bedside shift change report given to Santos Celeste RN (oncoming nurse) by Desiderio Spatz, RN (offgoing nurse). Report included the following information SBAR, Kardex, OR Summary, Intake/Output, MAR and Recent Results.

## 2018-06-06 NOTE — ANCILLARY DISCHARGE INSTRUCTIONS
[facility]  Discharge Phone Call       After-Care Discharge Phone Call Questions: no answer    Were you able to get your prescriptions filled? Comment:      [] Yes  []No    Comment if answer is \"No\"   Are you taking your medication(s) as your doctor ordered? Do you understand the purpose of your medications? Comment:    [] Yes  []No    Comment if answer is \"No\"   Are you taking any other medications that are not on the list?  Comment:      [] Yes  []No    Comment if answer is \"Yes\"   Do you have any questions about your medications? Are you aware of potential side effects? Comment:    [] Yes  []No    Comment if answer is \"Yes\"   Did you make your follow-up appointments (if the hospital did not do this before  discharge)? Comment:    [] Yes  []No    Comment if answer is \"No\"   Is there any reason you might not be able to keep your follow-up appointments? Comment:     [] Yes  []No    Comment if answer is \"Yes\"   Do you have any questions about your care plan? Are you aware of what health problems to be alert for? Comment:    [] Yes  []No    Comment if answer is \"Yes\"   Do you have a good understanding of how you should manage your health? Comment:    [] Yes  []No    Comment if answer is \"Yes\"   Do you know which symptoms to watch for that would mean you would need to call your doctor right away? Comment:      [] Yes  []No    Comment if answer is \"No\"   Do you have any questions about the follow up process or any instructions that we have provided? Comment:    [] Yes  []No    Comment if answer is \"Yes\"   Did staff take your preferences into account?         [] Yes  []No    Comment if answer is \"Yes\"

## (undated) DEVICE — COVER SURG EQUIP DRP MICSCP 118IN LEN 43IN W ZEISS OPMI

## (undated) DEVICE — 1010 S-DRAPE TOWEL DRAPE 10/BX: Brand: STERI-DRAPE™

## (undated) DEVICE — DRAPE THER FLUID WARMING 66X44 IN FLAT SLUSH DBL DISC ORS

## (undated) DEVICE — CRANIOTOMY DRAPE, STERILE: Brand: MEDLINE

## (undated) DEVICE — CATHETER IV 14GA L5.25IN PERIPH ORNG FEP POLYMER 3 BVL

## (undated) DEVICE — CORD BPLR 2 PIN FLAT AND RND DISP

## (undated) DEVICE — CODMAN® RANEY SCALP CLIPS 20 UNITS OF 10 CLIPS: Brand: CODMAN®

## (undated) DEVICE — SUTURE NRLN SZ 1 L18IN NONABSORBABLE BLK L36MM CT-1 1/2 CIR C520D

## (undated) DEVICE — SUBDURAL STRIP ELECTRODE, STAINLESS STEEL, 4 CONTACT, 1X4, CONTACT #1 RADIOPAQUE, SMOOTH, IOM CONNECTOR, 1 EXIT, ALL CONTACTS NUMBERED: Brand: CORTAC®

## (undated) DEVICE — CATHETER DRNGE 30FR 4 WNG DISP FOR NEPHSTMY MALECOTS

## (undated) DEVICE — SYRINGE BLB 50CC IRRIG PLIABLE FNGR FLNG GRAD FLSK DISP

## (undated) DEVICE — PACKING 8004007 NEURAY 200PK 13X76MM: Brand: NEURAY ®

## (undated) DEVICE — Device

## (undated) DEVICE — SCREW, AXS, SELF-DRILLING: Brand: UNIVERSAL NEURO 3

## (undated) DEVICE — DRAPE SHEET, X-LARGE: Brand: CONVERTORS

## (undated) DEVICE — ARGYLE FRAZIER SURGICAL SUCTION INSTRUMENT 10 FR/CH (3.3 MM): Brand: ARGYLE

## (undated) DEVICE — MAYFIELD® DISPOSABLE ADULT SKULL PIN (PLASTIC BASE): Brand: MAYFIELD®

## (undated) DEVICE — ARGYLE FRAZIER SURGICAL SUCTION INSTRUMENT 12 FR/CH (4.0 MM): Brand: ARGYLE

## (undated) DEVICE — MEDI-VAC NON-CONDUCTIVE SUCTION TUBING 6MM X 6.1M (20 FT.) L: Brand: CARDINAL HEALTH

## (undated) DEVICE — SYRINGE MED 20ML STD CLR PLAS LUERLOCK TIP N CTRL DISP

## (undated) DEVICE — PACKING 8004000 NEURAY 200PK 13X13MM: Brand: NEURAY ®

## (undated) DEVICE — MEDI-VAC NON-CONDUCTIVE SUCTION TUBING: Brand: CARDINAL HEALTH

## (undated) DEVICE — SPETZLER/BARRACUDA TIP, UNIVERSAL

## (undated) DEVICE — SUTURE NRLN SZ 4-0 L18IN NONABSORBABLE BLK L13MM TF 1/2 CIR C584D

## (undated) DEVICE — STAPLER SKIN H3.9MM WIRE DIA0.58MM CRWN 6.9MM 35 STPL FIX

## (undated) DEVICE — Z CONVERTED USE 2276507 CONNECTOR TBNG POLYPR 5IN1 TOUGH SHATTERPROOF CLN FOR

## (undated) DEVICE — CATH IV STR 16GX1.25IN GRA -- PROTECTIV PLUS

## (undated) DEVICE — STERILE POLYISOPRENE POWDER-FREE SURGICAL GLOVES: Brand: PROTEXIS

## (undated) DEVICE — NEEDLE HYPO 22GA L1.5IN BLK S STL HUB POLYPR SHLD REG BVL

## (undated) DEVICE — 3M™ IOBAN™ 2 ANTIMICROBIAL INCISE DRAPE 6650EZ: Brand: IOBAN™ 2

## (undated) DEVICE — SKIN MARKER,REGULAR TIP WITH RULER AND LABELS: Brand: DEVON

## (undated) DEVICE — SSC BONE WAX: Brand: SSC BONE WAX

## (undated) DEVICE — PACKING 8004004 NEURAY 200PK 13X38MM: Brand: NEURAY ®

## (undated) DEVICE — 8FR FRAZIER SUCTION HANDLE: Brand: CARDINAL HEALTH

## (undated) DEVICE — BAND RUB 1/8X2.5IN STRL --

## (undated) DEVICE — AMD ANTIMICROBIAL SUPER SPONGES,MEDIUM: Brand: KERLIX

## (undated) DEVICE — SYR 10ML CTRL LR LCK NSAF LF --

## (undated) DEVICE — GOWN,AURORA,NONRNF,XL,30/CS: Brand: MEDLINE

## (undated) DEVICE — SUTURE VCRL SZ 2-0 L18IN ABSRB UD CT-1 L36MM 1/2 CIR J839D

## (undated) DEVICE — SPONGE HEMOSTAT CELLULS 4X8IN -- SURGICEL

## (undated) DEVICE — KENDALL SCD EXPRESS SLEEVES, KNEE LENGTH, MEDIUM: Brand: KENDALL SCD

## (undated) DEVICE — DISPOSABLE REFLECTIVE SPHERES ATTACHED TO REFERENCE FRAMES AND SURGICAL INSTRUMENTS FOR USE DURING SURGICAL NAVIGATION IN IMAGE GUIDED SURGERY. ONE RETAIL CARTON IS MADE UP OF 4 SPHERE PER TRAY IN A POUCH. THERE ARE 12 TRAY-IN-POUCHES FOR A TOTAL OF 48 SPHERES.: Brand: NDI PASSIVE SPHERES

## (undated) DEVICE — 10FR FRAZIER SUCTION HANDLE: Brand: CARDINAL HEALTH

## (undated) DEVICE — NEEDLE HYPO 22GA L1.5IN BLK POLYPR HUB S STL REG BVL STR

## (undated) DEVICE — TOWEL: OR BLU 80/CS: Brand: MEDICAL ACTION INDUSTRIES